# Patient Record
Sex: FEMALE | Race: WHITE | Employment: FULL TIME | URBAN - NONMETROPOLITAN AREA
[De-identification: names, ages, dates, MRNs, and addresses within clinical notes are randomized per-mention and may not be internally consistent; named-entity substitution may affect disease eponyms.]

---

## 2017-05-15 ENCOUNTER — TELEPHONE (OUTPATIENT)
Dept: OPHTHALMOLOGY | Age: 47
End: 2017-05-15

## 2018-04-26 PROBLEM — N64.4 BILATERAL MASTODYNIA: Status: ACTIVE | Noted: 2018-04-26

## 2018-04-26 PROBLEM — S16.1XXA ACUTE CERVICAL MYOFASCIAL STRAIN: Status: ACTIVE | Noted: 2018-04-26

## 2018-04-26 PROBLEM — N62 HYPERTROPHY OF BREAST: Status: ACTIVE | Noted: 2018-04-26

## 2018-04-26 PROBLEM — R21 RASH, SKIN: Status: ACTIVE | Noted: 2018-04-26

## 2020-11-04 ENCOUNTER — HOSPITAL ENCOUNTER (OUTPATIENT)
Age: 50
Discharge: HOME | End: 2020-11-04

## 2020-11-04 PROCEDURE — 36415 COLL VENOUS BLD VENIPUNCTURE: CPT

## 2020-11-04 PROCEDURE — 86481 TB AG RESPONSE T-CELL SUSP: CPT

## 2020-11-07 LAB — T-SPOT TB TEST: NORMAL

## 2020-12-10 ENCOUNTER — OFFICE VISIT (OUTPATIENT)
Dept: ORTHOPEDIC SURGERY | Age: 50
End: 2020-12-10
Payer: COMMERCIAL

## 2020-12-10 VITALS — WEIGHT: 167 LBS | HEIGHT: 61 IN | BODY MASS INDEX: 31.53 KG/M2

## 2020-12-10 PROCEDURE — 99203 OFFICE O/P NEW LOW 30 MIN: CPT | Performed by: STUDENT IN AN ORGANIZED HEALTH CARE EDUCATION/TRAINING PROGRAM

## 2020-12-10 PROCEDURE — 20610 DRAIN/INJ JOINT/BURSA W/O US: CPT | Performed by: STUDENT IN AN ORGANIZED HEALTH CARE EDUCATION/TRAINING PROGRAM

## 2020-12-10 RX ORDER — BUPIVACAINE HYDROCHLORIDE 2.5 MG/ML
2 INJECTION, SOLUTION INFILTRATION; PERINEURAL ONCE
Status: COMPLETED | OUTPATIENT
Start: 2020-12-10 | End: 2020-12-10

## 2020-12-10 RX ORDER — VENLAFAXINE HYDROCHLORIDE 37.5 MG/1
37.5 CAPSULE, EXTENDED RELEASE ORAL DAILY
COMMUNITY
Start: 2020-05-11 | End: 2021-01-14

## 2020-12-10 RX ORDER — MELOXICAM 7.5 MG/1
TABLET ORAL
COMMUNITY
Start: 2020-11-04 | End: 2021-02-11 | Stop reason: SDUPTHER

## 2020-12-10 RX ORDER — METHYLPREDNISOLONE ACETATE 80 MG/ML
80 INJECTION, SUSPENSION INTRA-ARTICULAR; INTRALESIONAL; INTRAMUSCULAR; SOFT TISSUE ONCE
Status: COMPLETED | OUTPATIENT
Start: 2020-12-10 | End: 2020-12-10

## 2020-12-10 RX ADMIN — METHYLPREDNISOLONE ACETATE 80 MG: 80 INJECTION, SUSPENSION INTRA-ARTICULAR; INTRALESIONAL; INTRAMUSCULAR; SOFT TISSUE at 13:52

## 2020-12-10 RX ADMIN — BUPIVACAINE HYDROCHLORIDE 5 MG: 2.5 INJECTION, SOLUTION INFILTRATION; PERINEURAL at 13:52

## 2020-12-10 NOTE — PROGRESS NOTES
[facility]  [facility] ORTHO SPECIALISTS  [address] [facility] [address]  Dept: [phone]    Ambulatory Orthopedic Consult      CHIEF COMPLAINT:    Chief Complaint   Patient presents with    New Patient     RT HIP PAIN x3 weeks radiating down to knee       HISTORY OF PRESENT ILLNESS:      The patient is a 53 y.o. female who is being seen at the request of  Lilia Banerjee M.D., MD for consultation and evaluation of right hip pain. Patient states she has had right hip pain for approximately 3 weeks. Patient states it is over her proximal lateral thigh as well as her lower back. Patient denies any numbness or tingling. Patient states her pain does radiate down into the lateral aspect of her right knee. Patient states she had a similar issue 2 years ago when she was preparing for marathon and she thought it was her piriformis but it did improve without any intervention. Patient does take Mobic for another issue as well as Tylenol. Patient states the Mobic does not help with her right hip pain but the Tylenol does take the edge away. Patient states very painful in her right side as well as her back. Patient denies any low back injury. No prior history of trauma to her right lower extremity. Patient does work in the [employer] at [address] [facility].       Past Medical History:    Past Medical History:   Diagnosis Date    Hyperlipidemia     Seasonal allergies        Past Surgical History:    Past Surgical History:   Procedure Laterality Date    APPENDECTOMY      DILATION AND CURETTAGE OF UTERUS  1993    DILATION AND CURETTAGE OF UTERUS  1997    HYSTEROSCOPY      KNEE ARTHROSCOPY Right 03/26/90    TUBAL LIGATION  2001    WISDOM TOOTH EXTRACTION         CurrentMedications:   Current Outpatient Medications   Medication Sig Dispense Refill    meloxicam (MOBIC) 7.5 MG tablet       venlafaxine (EFFEXOR XR) 37.5 MG extended release capsule Take 37.5 mg by mouth daily      sertraline (ZOLOFT) 50 MG tablet Take 50 mg by mouth      rOPINIRole (REQUIP) 0.25 MG tablet Take 0.25 mg by mouth       Current Facility-Administered Medications   Medication Dose Route Frequency Provider Last Rate Last Dose    methylPREDNISolone acetate (DEPO-MEDROL) injection 80 mg  80 mg Intra-articular Once Valdene Duet, DO        bupivacaine (MARCAINE) 0.25 % injection 5 mg  2 mL Intra-articular Once Valdene Duet, DO           Allergies:    Latex; Betadine [povidone iodine]; and Vicodin [hydrocodone-acetaminophen]    Social History:      Social History     Socioeconomic History    Marital status:      Spouse name: Not on file    Number of children: Not on file    Years of education: Not on file    Highest education level: Not on file   Occupational History    Not on file   Social Needs    Financial resource strain: Not on file    Food insecurity     Worry: Not on file     Inability: Not on file    Transportation needs     Medical: Not on file     Non-medical: Not on file   Tobacco Use    Smoking status: Never Smoker    Smokeless tobacco: Never Used   Substance and Sexual Activity    Alcohol use:  Yes    Drug use: No    Sexual activity: Not on file   Lifestyle    Physical activity     Days per week: Not on file     Minutes per session: Not on file    Stress: Not on file   Relationships    Social connections     Talks on phone: Not on file     Gets together: Not on file     Attends [employer]al service: Not on file     Active member of club or organization: Not on file     Attends meetings of clubs or organizations: Not on file     Relationship status: Not on file    Intimate partner violence     Fear of current or ex partner: Not on file     Emotionally abused: Not on file     Physically abused: Not on file     Forced sexual activity: Not on file   Other Topics Concern    Not on file   Social History Narrative    Not on file       Family History:  Family History   Problem Relation Age

## 2020-12-22 NOTE — FLOWSHEET NOTE
[x] [facility] &  Therapy  [address]    [phone]  F: [phone]   [] [address]  [facility]   [address]  P: [phone]  F: [phone]  [] [address] &  Therapy  [address]  P: [phone]  F: [phone] [] [address]  P: [phone]  F: [phone]  [] [address]  [address] [facility] [address]   [address]   [place]: [phone]  F: [phone]   [] [facility]  [address] [facility] [address]  [place]: [phone]   F: [phone]     Physical Therapy Cancel/No Show note    Date: 2020  Patient: Matti Betts  : 1970  MRN: [id number]    Cancels/No Shows to date:   For today's appointment patient:    []  Cancelled    [x] Rescheduled appointment    [] No-show     Reason given by patient:    []  Patient ill    []  Conflicting appointment    [] No transportation      [] Conflict with work    [] No reason given    [] Weather related    [] [id number]    [x] Other:      Comments: We had to R/S pt. Due to PT not available.     [] Next appointment was confirmed    Electronically signed by: Mirna Dominguez

## 2020-12-23 ENCOUNTER — HOSPITAL ENCOUNTER (OUTPATIENT)
Dept: PHYSICAL THERAPY | Age: 50
Setting detail: THERAPIES SERIES
Discharge: HOME | End: 2020-12-23
Payer: COMMERCIAL

## 2020-12-30 ENCOUNTER — HOSPITAL ENCOUNTER (OUTPATIENT)
Dept: PHYSICAL THERAPY | Age: 50
Setting detail: THERAPIES SERIES
Discharge: HOME | End: 2020-12-30
Payer: COMMERCIAL

## 2020-12-30 PROCEDURE — 97110 THERAPEUTIC EXERCISES: CPT

## 2020-12-30 PROCEDURE — 97161 PT EVAL LOW COMPLEX 20 MIN: CPT

## 2020-12-30 NOTE — CONSULTS
[x] [facility]  Outpatient Physical Therapy  [address]  Phone: [phone]  Fax: [phone] [] [facility] at [address] [place] [address]  Phone: [phone]   Fax: [phone]     Physical Therapy Evaluation    Date:  2020  Patient: Mary Dozier  : 1970  MRN: [id number]  Physician: Priscila Olguin DO, Jake Bradley DO    Insurance: [email or web address]  Medical Diagnosis: Piriformis syndrome Right G57.01, Trochanteric bursitis of right hip M70.61    Rehab Codes: G57.01, M25.551, M25.651  Onset Date: 20                               Next 's appt:     Subjective:   CC:Patient reports Right Lateral hip pain that originates in her buttock. Also notes pain from lateral hip mid thigh> Pain is worse lying directly on her R side and occasionally with prolonged sitting. Pain improves with standing activity. HPI: No direct trauma but patient does recall similar pain in spring of 2020 when she began training for a half marathon. Pain did subside for several months then returned about 2 months ago and has increased in severity. Training eventually ended due to a knee issue.      PMHx: [] Unremarkable [] Diabetes [] HTN  [] Pacemaker   [] MI/Heart Problems [] Cancer [] Arthritis [] Asthma                         [x] refer to full medical chart  In [facility]  [] Other:        Comorbidities:   [x] Obesity [] Dialysis  [] Other:   [] Asthma/COPD [] Dementia [] Other:   [] Stroke [] Sleep apnea [] Other:   [] Vascular disease [] Rheumatic disease [] Other:     Tests: [] X-Ray: [] MRI:  [] Other:  Impression:   No acute osseous abnormality         Medications: [x] Refer to full medical record [] None [] Other:  Allergies:      [x] Refer to full medical record  [] None [] Other:    Working:  [x] Full-time  [] Part-time  [] Off d/t condition  [] Retired  [] Disability  [] N/A  Job/Employer: RN [employer]     Pain:  [x] Yes [] No Location: Right hip/ piriformis Pain Rating: (0-10 scale) 4-7/10  Pain altered Tx:  [] Yes  [x] No  Action:    Objective: p = pain, L = Lacks      ROM  ° A/P STRENGTH    Left Right Left Right   Hip Flexion   full 5/5 4/5   Ext  full 5/5 4-/5   Abd  full 5/5 4-/5   Add  full 5/5 5/5   ER       IR            OBSERVATION Comments   Posture No deficit    Joint Alignment Slight anterior pelvic tilt   Gait No deficit    Palpation Tenderness along R piriformis and R greater trochanter   Edema No deficit    Neurological No deficit      Assessment: Patient presents with R piriformis syndrome and mild greater trochanteric pain syndrome. Patient will benefit from PT to improve R glute strength, resolve pain, and improve ability to perform side lying and prolonged sitting. Problems:    [x] ? Pain: 4-/7 R hip pain. [x] ? Flexibility:Piriformis, hip flexor  [x] ? Strength: Piriformis, glutes  [x] ? Function: LEFS score 58% functional  [] Other:    STG: (to be met in 8 treatments)  1. ? Pain: 2/10 R hip with side lying. 2. ? Strength: 4+/5 R hip extension and abduction to improve posterior hip stability. 3. ? Function: LEFS score to 78% functional to improve ADLs. 4. Independent with Home Exercise Programs    LTG: (to be met in 12 treatments)  1. Patient is able to jog without pain. 2. Patient is able to sleep on her R side. Patient goals: Reduce R hip pain and improve function. Rehab Potential:  [x] Good  [] Fair  [] Poor   Suggested Professional Referral:  [x] No  [] Yes:  Barriers to Goal Achievement[de-identified]  [x] No  [] Yes:  Domestic Concerns:  [x] No  [] Yes:    Pt. Education:  [x] Plans/Goals, Risks/Benefits discussed  [x] Home exercise program: See chart below  Method of Education: [x] Verbal  [x] Demo  [x] Written  Comprehension of Education:  [x] Verbalizes understanding. [x] Demonstrates understanding. [x] Needs Review.   [] Demonstrates/verbalizes understanding of HEP/Ed previously given.    Treatment Plan:  [x] Therapeutic Exercise   26949  [x] Vasopneumatic cold with compression  02852    [x] Therapeutic Activity  52970 [x] Cold/hotpack    [x] Gait Training   56554 [] Lumbar/Cervical Traction  [id number]   [x] Neuromuscular Re-education  58946 [x] Electrical Stimulation Unattended  93707   [x] Manual Therapy    64276 [x] Electrical Stimulation Attended  03590   [] Iontophoresis: 4 mg/mL Dexamethasone Sodium Phosphate  mAmin  31679 []  Medication allergies reviewed for use of   Dexamethasone Sodium Phosphate 4mg/ml with iontophoresis treatments. Pt is not allergic. Frequency:  2 x/week for 12 visits    Todays Treatment:  Precautions:  Modalities:   Exercises:  Access Code: [email or web address]   URL: [email or web address]   Date: 12/30/2020   Prepared by: Diamante Dial     Exercises   Prone Hip Extension - 10 reps - 3 sets - 1x daily - 7x weekly   Clamshell with Resistance - 10 reps - 3 sets - 1x daily - 7x weekly   Supine Bridge with Resistance Band - 10 reps - 3 sets - 1x daily - 7x weekly   Sidelying Hip Abduction - 10 reps - 3 sets - 1x daily - 7x weekly   Supine Figure 4 Piriformis Stretch - 10 reps - 3 sets - 1x daily - 7x weekly   Other:     Specific Instructions for next treatment[de-identified] Hip strengthening, focus on glutes and core.        Evaluation Complexity:  History (Personal factors, comorbidities) [] 0 [x] 1-2 [] 3+   Exam (limitations, restrictions) [] 1-2 [x] 3 [] 4+   Clinical presentation (progression) [x] Stable [] Evolving  [] Unstable   Decision Making [x] Low [] Moderate [] High    [x] Low Complexity [] Moderate Complexity [] High Complexity       Treatment Charges: Mins Units   [x] Evaluation       [x]  Low       []  Moderate       []  High 25 1   []  Modalities     [x]  Ther Exercise 15 1   []  Manual Therapy     []  Ther Activities     []  Aquatics     []  Vasocompression     []  Other       TOTAL TREATMENT TIME: 40    Time in:1500     Time out:1540    Electronically signed by: Jaz Campbell, PT

## 2021-01-04 ENCOUNTER — HOSPITAL ENCOUNTER (OUTPATIENT)
Dept: PHYSICAL THERAPY | Age: 51
Setting detail: THERAPIES SERIES
Discharge: HOME | End: 2021-01-04
Payer: COMMERCIAL

## 2021-01-04 PROCEDURE — 97110 THERAPEUTIC EXERCISES: CPT

## 2021-01-04 PROCEDURE — 97140 MANUAL THERAPY 1/> REGIONS: CPT

## 2021-01-04 NOTE — FLOWSHEET NOTE
[x] [facility]) [facility] &  Therapy  [address]  [phone]  F: [phone] [] [address]  [place] [address]   [address]  P: [phone]  F: [phone] [] [place]  [address]  P: [phone]  F: [phone] [] [address]  P: [phone]  F: [phone] [] [address]  [facility]   [address]   [place]: [phone]  F: [phone]      Physical Therapy Daily Treatment Note    Date:  2021  Patient Name:  Reinaldo Wood    :  1970  MRN: [id number]   Physician: Kaleb Blackwell DO, Radha Rodriguez DO                  Insurance: [email or web address]  Medical Diagnosis: Piriformis syndrome Right G57.01, Trochanteric bursitis of right hip M70.61                         Rehab Codes: G57.01, M25.551, M25.651  Onset Date: 20                               Next 's appt:   Visit# / total visits:  ; Progress note for insurance  STG due at visit # 8    Cancels/No Shows: 0/0    Subjective:    Pain:  [x] Yes  [] No Location: R hip Pain Rating: (0-10 scale) 0/10 no hip pain with arrival, tightness   Pain altered Tx:  [x] No  [] Yes  Action:  Comments:  Coming from work, static  standing a lot this date. Reports  L knee  Pain today. Addressing HEP as recommended and reports she is feeling better but not where she want to be yet.      Objective:  Modalities:   Precautions:  Exercises:  Exercise R hip  Reps/ Time Weight/ Level Comments   sci fit 5 mins L 3          Standing      Hip abd 10x2 A    Hip ext  10x2 A    squats add     Sitting      HS stretching  2x30\"     stool         Supine      Quad sets       Piriformis stretch 3x30\"  Figure 4   HS stretch 1x30\"  Belt pt refers, will discontinue sitting Method of Education: [x] Verbal  [x] Demo  [] Written  Comprehension of Education:  [x] Verbalizes understanding. [x] Demonstrates understanding. [] Needs review. [x] Demonstrates/verbalizes HEP/Ed previously given. Plan: [x] Continue current frequency toward long and short term goals. [x] Specific Instructions for subsequent treatments:  Add squats and progress hip ER, gluteal  strength        Time In: 1516            Time Out: 1615    Electronically signed by:  Michela Lyle PTA

## 2021-01-06 ENCOUNTER — HOSPITAL ENCOUNTER (OUTPATIENT)
Dept: PHYSICAL THERAPY | Age: 51
Setting detail: THERAPIES SERIES
Discharge: HOME | End: 2021-01-06
Payer: COMMERCIAL

## 2021-01-06 PROCEDURE — 97110 THERAPEUTIC EXERCISES: CPT

## 2021-01-06 PROCEDURE — 97140 MANUAL THERAPY 1/> REGIONS: CPT

## 2021-01-06 NOTE — FLOWSHEET NOTE
[x] [facility]) - [facility] &  Therapy  [address]  [phone]  F: [phone] [] [address]  [place] [address]   [address]  P: [phone]  F: [phone] [] [address] &  Therapy  [address] [facility]  P: [phone]  F: [phone] [] [address]  P: [phone]  F: [phone] [] [address]  [facility]   [address]   [place]: [phone]  F: [phone]      Physical Therapy Daily Treatment Note    Date:  2021  Patient Name:  Carlos Martin    :  1970  MRN: [id number]   Physician: April Cummins DO, Gwynne Fairly, DO                  Insurance: [employer]  Medical Diagnosis: Piriformis syndrome Right G57.01, Trochanteric bursitis of right hip M70.61                         Rehab Codes: G57.01, M25.551, M25.651  Onset Date: 20                               Next 's appt:   Visit# / total visits:  ; Progress note for insurance  STG due at visit # 8    Cancels/No Shows: 0/0    Subjective:    Pain:  [x] Yes  [] No Location: R hip Pain Rating: (0-10 scale) 4/10   Pain altered Tx:  [x] No  [] Yes  Action:  Comments:  Pt coming in from work, states she has 4/10 pain upon arrival. Pt says following last tx she was able to sleep through the night with MIN discomfort. Pt enjoyed Hypervolt last session.     Objective:  Modalities:   Precautions:  Exercises:  Exercise R hip  Reps/ Time Weight/ Level Comments   sci fit 5 mins L 3          Standing      Hip abd 10x2 A    Hip ext  10x2 A    squats 2x10     Gastroc stretch 2x30\"  ADDED 1/6   HS stretching  2x30\"  Modified to standing 1/6                       Supine      Quad sets       Piriformis stretch 3x30\"  Figure 4 HS stretch 1x30\"  Belt pt refers, will discontinue sitting    IT band stretch 2x30\"  belt   bridging 10x2     SLR 10x2 A Lumbar roll -- No roll used 1/6   iso abdom 10x1     iso abdom March 10x1     iso adom alt opp U/LE 10x1            Side lying      Manual  8 mins  See below   clamshells 10x2 blue 1x10 done due to fatigue    Hip abd 10x1           Prone      Hip ext 10x1     Hip ER 10x1  5\" HELD                Other:   Manual:  hypervolt to right piriformis and gluts in sidelying x 8 mins. Demo and verbal education on self trigger point release and soft tissue massage to gluts/piriformis again this date, pt voiced understanding. Treatment Charges: Mins Units   []  Modalities     [x]  Ther Exercise 50 3   [x]  Manual Therapy 8 1   []  Ther Activities     []  Aquatics     []  Vasocompression     []  Other     Total Treatment time  58 4       Assessment: [x] Progressing toward goals Pt displayed good tolerance to hip abd/ext, added squats to tx plan which patient was able to tolerate with no increased pain or discomfort noted. Verbal cues needed to correct posture/technique. Pt able to complete core and LB strengthening exercises this day with no LB pain/spasms during tx. Fatigue noted during clams however, pt able to complete 1 set each. Pt states most relief from Hypervolt and stretching to gluts and IT band, says she feels better post tx session. Overall, educated patient to continue stretching at home as able with good verbal understanding. [] No change. [] Other:   [x] Patient would continue to benefit from skilled physical therapy services in order to: improve core, hip and gluteal strengthen and HS, IT band and piriformis flexibility. STG: (to be met in 8 treatments)  1. ? Pain: 2/10 R hip with side lying. 2. ? Strength: 4+/5 R hip extension and abduction to improve posterior hip stability. 3. ? Function: LEFS score to 78% functional to improve ADLs. 4. Independent with Home Exercise Programs     LTG: (to be met in 12 treatments)  1. Patient is able to jog without pain. 2. Patient is able to sleep on her R side.      Patient goals: Reduce R hip pain and improve function. Pt. Education:  [x] Yes  [] No  [] Reviewed Prior HEP/Ed  Method of Education: [x] Verbal  [x] Demo  [] Written  Comprehension of Education:  [x] Verbalizes understanding. [x] Demonstrates understanding. [] Needs review. [x] Demonstrates/verbalizes HEP/Ed previously given. Plan: [x] Continue current frequency toward long and short term goals. [x] Specific Instructions for subsequent treatments: progress hip ER, gluteal  Strength, start lunges. Time In: 2:00 pm            Time Out: 3:00pm    Electronically signed by:  Augustina Mo    Patient treated and note written by Augustina Mo, Student Physical Therapist Assistant under supervision of Elo Zuniga PTA.

## 2021-01-12 ENCOUNTER — HOSPITAL ENCOUNTER (OUTPATIENT)
Dept: PHYSICAL THERAPY | Age: 51
Setting detail: THERAPIES SERIES
Discharge: HOME | End: 2021-01-12
Payer: COMMERCIAL

## 2021-01-12 PROCEDURE — 97140 MANUAL THERAPY 1/> REGIONS: CPT

## 2021-01-12 PROCEDURE — 97110 THERAPEUTIC EXERCISES: CPT

## 2021-01-12 NOTE — FLOWSHEET NOTE
[x] [facility]) [facility]  [facility]  [address]  [phone]  F: [phone] [] [address]  [place] [address]   [address]  P: [phone]  F: [phone] [] [address] &  Therapy  [address]  P: [phone]  F: [phone] [] [address]  P: [phone]  F: [phone] [] [address]  [facility]   [address]   [place]: [phone]  F: [phone]      Physical Therapy Daily Treatment Note    Date:  2021  Patient Name:  Shirley Kim    :  1970  MRN: [id number]   Physician: Quincy Moarles DO, Kirk Shah DO                  Insurance: [employer]  Medical Diagnosis: Piriformis syndrome Right G57.01, Trochanteric bursitis of right hip M70.61                         Rehab Codes: G57.01, M25.551, M25.651  Onset Date: 20                               Next 's appt:   Visit# / total visits:  ; Progress note for insurance  STG due at visit # 8    Cancels/No Shows: 0/0    Subjective:    Pain:  [x] Yes  [] No Location: R hip Pain Rating: (0-10 scale) 5/10   Pain altered Tx:  [x] No  [] Yes  Action:  Comments:  Patient demonstrates onset of slightly different pain that is anterior in her hip and extending into her thigh.      Objective:  Modalities:   Precautions:  Exercises:  Exercise R hip  Reps/ Time Weight/ Level Comments   sci fit 5 mins L 3          Standing      4 way hip  20x lime    squats 20x     Lateral step ups  20x     Power strides  20x      Squats  20x           Supine      4 way SLR 15x 3 lb    Piriformis stretch 3x30\"  Figure 4   HS stretch 1x30\"     Bridge w band  10x2 plum           Side lying      clamshells 20x plum    Hip abd 20x 3 lb           Prone      HS curls       Hip ext 10x1     Hip ER 10x1  5\" HELD                Other:   Manual:  Hypervolt to right piriformis, glutes, and quads in sidelying x 8 mins. Treatment Charges: Mins Units   []  Modalities     [x]  Ther Exercise 47 3   [x]  Manual Therapy 8 1   []  Ther Activities     []  Aquatics     []  Vasocompression     []  Other     Total Treatment time  55 4       Assessment: [x] Progressing toward goals. Patient tolerated all exercises well. [] No change. [x] Other:  Possibly presenting with mild hip flexor strain due to change in her symptoms. [x] Patient would continue to benefit from skilled physical therapy services in order to: improve core, hip and gluteal strengthen and HS, IT band and piriformis flexibility. STG: (to be met in 8 treatments)  1. ? Pain: 2/10 R hip with side lying. 2. ? Strength: 4+/5 R hip extension and abduction to improve posterior hip stability. 3. ? Function: LEFS score to 78% functional to improve ADLs. 4. Independent with Home Exercise Programs     LTG: (to be met in 12 treatments)  1. Patient is able to jog without pain. 2. Patient is able to sleep on her R side.      Patient goals: Reduce R hip pain and improve function. Pt. Education:  [x] Yes  [] No  [] Reviewed Prior HEP/Ed  Method of Education: [x] Verbal  [x] Demo  [] Written  Comprehension of Education:  [x] Verbalizes understanding. [x] Demonstrates understanding. [] Needs review. [x] Demonstrates/verbalizes HEP/Ed previously given. Plan: [x] Continue current frequency toward long and short term goals. [x] Specific Instructions for subsequent treatments: add SL bridges.       Time In: 4:00 pm            Time Out: 5:00 pm    Electronically signed by:  Afia Contreras, PT

## 2021-01-14 ENCOUNTER — OFFICE VISIT (OUTPATIENT)
Dept: FAMILY MEDICINE CLINIC | Age: 51
End: 2021-01-14
Payer: COMMERCIAL

## 2021-01-14 ENCOUNTER — HOSPITAL ENCOUNTER (OUTPATIENT)
Dept: PHYSICAL THERAPY | Age: 51
Setting detail: THERAPIES SERIES
Discharge: HOME | End: 2021-01-14
Payer: COMMERCIAL

## 2021-01-14 VITALS
HEART RATE: 83 BPM | TEMPERATURE: 98.1 F | RESPIRATION RATE: 16 BRPM | SYSTOLIC BLOOD PRESSURE: 132 MMHG | OXYGEN SATURATION: 97 % | BODY MASS INDEX: 31.72 KG/M2 | DIASTOLIC BLOOD PRESSURE: 82 MMHG | WEIGHT: 168 LBS | HEIGHT: 61 IN

## 2021-01-14 DIAGNOSIS — R63.5 WEIGHT GAIN: ICD-10-CM

## 2021-01-14 DIAGNOSIS — F32.A DEPRESSION, UNSPECIFIED DEPRESSION TYPE: ICD-10-CM

## 2021-01-14 DIAGNOSIS — Z00.00 PREVENTATIVE HEALTH CARE: Primary | ICD-10-CM

## 2021-01-14 DIAGNOSIS — Z12.31 SCREENING MAMMOGRAM, ENCOUNTER FOR: ICD-10-CM

## 2021-01-14 DIAGNOSIS — G47.00 INSOMNIA, UNSPECIFIED TYPE: ICD-10-CM

## 2021-01-14 PROBLEM — R23.2 HOT FLASHES: Status: ACTIVE | Noted: 2020-02-13

## 2021-01-14 PROBLEM — M77.12 LATERAL EPICONDYLITIS OF LEFT ELBOW: Status: ACTIVE | Noted: 2017-05-09

## 2021-01-14 PROBLEM — S16.1XXA ACUTE CERVICAL MYOFASCIAL STRAIN: Status: RESOLVED | Noted: 2018-04-26 | Resolved: 2021-01-14

## 2021-01-14 PROBLEM — H52.13 MYOPIA OF BOTH EYES WITH ASTIGMATISM AND PRESBYOPIA: Status: ACTIVE | Noted: 2019-04-16

## 2021-01-14 PROBLEM — H52.203 MYOPIA OF BOTH EYES WITH ASTIGMATISM AND PRESBYOPIA: Status: ACTIVE | Noted: 2019-04-16

## 2021-01-14 PROBLEM — R21 RASH, SKIN: Status: RESOLVED | Noted: 2018-04-26 | Resolved: 2021-01-14

## 2021-01-14 PROBLEM — M25.40 JOINT SWELLING: Status: ACTIVE | Noted: 2020-02-13

## 2021-01-14 PROBLEM — E78.5 HYPERLIPIDEMIA: Status: ACTIVE | Noted: 2021-01-14

## 2021-01-14 PROBLEM — H52.4 MYOPIA OF BOTH EYES WITH ASTIGMATISM AND PRESBYOPIA: Status: ACTIVE | Noted: 2019-04-16

## 2021-01-14 PROCEDURE — 99204 OFFICE O/P NEW MOD 45 MIN: CPT | Performed by: NURSE PRACTITIONER

## 2021-01-14 RX ORDER — VENLAFAXINE HYDROCHLORIDE 75 MG/1
75 CAPSULE, EXTENDED RELEASE ORAL DAILY
Qty: 30 CAPSULE | Refills: 3 | Status: SHIPPED | OUTPATIENT
Start: 2021-01-14 | End: 2021-02-18 | Stop reason: SDUPTHER

## 2021-01-14 RX ORDER — HYDROXYZINE HYDROCHLORIDE 25 MG/1
25 TABLET, FILM COATED ORAL NIGHTLY PRN
Qty: 30 TABLET | Refills: 0 | Status: SHIPPED | OUTPATIENT
Start: 2021-01-14 | End: 2022-03-14 | Stop reason: SDUPTHER

## 2021-01-14 RX ORDER — PHENTERMINE HYDROCHLORIDE 37.5 MG/1
37.5 CAPSULE ORAL EVERY MORNING
Qty: 30 CAPSULE | Refills: 0 | Status: SHIPPED | OUTPATIENT
Start: 2021-01-14 | End: 2021-02-13

## 2021-01-14 ASSESSMENT — ENCOUNTER SYMPTOMS
SHORTNESS OF BREATH: 0
ABDOMINAL PAIN: 0
CHEST TIGHTNESS: 0
COLOR CHANGE: 0
SORE THROAT: 0
ABDOMINAL DISTENTION: 0
NAUSEA: 0
DIARRHEA: 0
CONSTIPATION: 0
BACK PAIN: 0
COUGH: 0
RHINORRHEA: 0

## 2021-01-14 ASSESSMENT — PATIENT HEALTH QUESTIONNAIRE - PHQ9
SUM OF ALL RESPONSES TO PHQ9 QUESTIONS 1 & 2: 1
1. LITTLE INTEREST OR PLEASURE IN DOING THINGS: 0

## 2021-01-14 NOTE — PATIENT INSTRUCTIONS
Health Maintenance Recommendations  Exercise   · I generally recommend that people of all ages try to get 150 minutes of physical activity per week and it doesnt matter how this totals up, in other words 30 minutes 5 days per week is as good as 50 minutes 3 days a week and so on. · The level of activity should be such that it is able to get your heart rate up to 100 or more, for example a brisk walk should achieve this rate. Dietary Recommendations  · In terms of diet, I generally recommend trying to eat a healthy well balanced diet full of fruits and vegetables. Avoid carbonated drinks and fruit juices and limit your alcohol use. · Avoid processed foods wherever possible (anything that comes in a can or a box) which can be achieved by sticking to the outside walls of the grocery store where generally you will find fresh fruits/vegetables, meats, dairy, and frozen foods. · Try to avoid starches in the diet where possible and minimize bread, rice, potatoes, and pasta in the diet. Specifically try to avoid gluten, which even in people that dont have a fatou allergy, causes havoc in the small intestine and alters absorption of nutrients which can in turn lead to obesity. Sleep  · Try to achieve a regular sleep schedule, waking and laying down at the same time each night. Most people need 7 hours per night plus or minus 2 hours. · You will know that youre getting enough because you will wake feeling refreshed and not need to sleep in to catch up on weekends. Skin Care  · Make sure that you dont neglect your skin. · Play it safe in the sun. Use a sunblock on all of your exposed skin. · The sunblock should be broad spectrum and water resistant. · I do recommend an SPF 30 or higher sun screen any time that you plan to be in the sun for more than 20 minutes, even in the winter or on cloudy days (keep in mind that UV light penetrates clouds and can cause burns even on cloudy days). · Apply 20 to 30 minutes before going out in the sun. Reapply sunblock every 2 hours and after swimming or sweating. Zayda Todd can also damage your skin on cool, windy days. Clouds and fog do not filter UV light. Make sure you reapply sunblock every 2 hours. · Avoid the sun in the middle of the day, between 10 AM to 4 PM. Your unprotected skin can be damaged in 15 minutes with direct sun exposure. Personal Health  · If you smoke, STOP. There are many resources available to help you successfully quit. · If you are sexually active, always practice safe sex and wear a condom. · See a dentist every 6 months. · See an eye doctor regularly. · Always wear a seat belt while in car. · Get a flu vaccine annually. · Get a tetanus booster vaccine every 10 years. Psychosocial Health  · Finally, make sure that you always have something to look forward to whether this is a vacation, a special event, or just a weekend off work. Having something to look forward to helps to maintain positive focus and is good for mental health.

## 2021-01-14 NOTE — PROGRESS NOTES
Jane Velasco, APRN-CNP  [address]  [address] [address]  [address] [address]  Dept: [phone]  Dept Fax: [phone]     Patient ID: Nasrin Maxwell is a 53 y.o. female. HPI    Nasrin Maxwell is a 53 y.o. female who presents to the office today for a first visit and to establish a relationship with a new primary care provider. 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 last seen: 2020. She has 3 sons that are successful youngest is 23. She is going back for her bachelor degree, she was at [place] [address] and going to be transferring to [employer]. Mild- moderate hearing loss to left ear and moderate -severe in right, she has hearing aids and sees audiologist for it, she is getting new hearing aids in February. Having some insomnia she sleeps about 2 hours and then wakes up, she has trouble falling asleep. Today, the patient complains of weight gain, she has been struggling since menopause started and she has put 20 lbs on.  she has some hip issues that she is going to PT for and that has made it hard for her to work out like she has in the past. She has trained and done half marathons in the past. She states she has done adipex a few years ago and that really helped her to get going on the weight loss and worked well. She is interested in trying that again. She also notices that she is more depressed and knows it is related to covid, weight gain, and feeling tired. Effexor is taking the edge off but she thinks that it might be time to increase, she is having a lot of hot flashes and feels that the Effexor does help with that as well. Pt denies any N/V/D/C or abdominal pain. Pt denies any fever or chills. Pt today denies any HA, chest pain, or SOB. Preventative care, female:  Last mammogram: 5/4/2020  Last PAP: 2/13/2020  Nicotine use: no  Alcohol use: occasionally  Drug use: no  Dental exam: 6 months ago  Eye exam: May, 2020    Family history of colon cancer. The patient's past medical, surgical, social, and family history as well as her current medications and allergies were reviewed as documented in today's encounter MADINA Kay. Current Outpatient Medications on File Prior to Visit   Medication Sig Dispense Refill    meloxicam (MOBIC) 7.5 MG tablet       venlafaxine (EFFEXOR XR) 37.5 MG extended release capsule Take 37.5 mg by mouth daily      sertraline (ZOLOFT) 50 MG tablet Take 50 mg by mouth      rOPINIRole (REQUIP) 0.25 MG tablet Take 0.25 mg by mouth       No current facility-administered medications on file prior to visit. Subjective:     Review of Systems   Constitutional: Positive for activity change (due to hip going to PT). Negative for appetite change, fatigue and fever. HENT: Negative for congestion, ear pain, rhinorrhea and sore throat. Respiratory: Negative for cough, chest tightness and shortness of breath. Cardiovascular: Negative for chest pain and palpitations. Gastrointestinal: Negative for abdominal distention, abdominal pain, constipation, diarrhea and nausea. Endocrine: Negative for polydipsia, polyphagia and polyuria. Genitourinary: Negative for difficulty urinating and dysuria. Musculoskeletal: Positive for arthralgias, joint swelling (right hip) and myalgias. Negative for back pain. Skin: Negative for color change and rash. Neurological: Negative for dizziness, weakness, light-headedness and headaches. Hematological: Negative for adenopathy. Psychiatric/Behavioral: Positive for dysphoric mood. Negative for agitation and behavioral problems. The patient is nervous/anxious.       Vitals:    01/14/21 1505   BP: 132/82   Pulse: 83   Resp: 16   Temp: 98.1 °F (36.7 °C)   SpO2: 97% she did have a bang today. Objective:     Physical Exam  Vitals signs reviewed. Constitutional:       General: She is not in acute distress. Appearance: Normal appearance. HENT:      Head: Normocephalic and atraumatic. Right Ear: External ear normal.      Left Ear: External ear normal.      Nose: Nose normal.      Mouth/Throat:      Mouth: Mucous membranes are moist.      Pharynx: No oropharyngeal exudate or posterior oropharyngeal erythema. Eyes:      Extraocular Movements: Extraocular movements intact. Conjunctiva/sclera: Conjunctivae normal.      Pupils: Pupils are equal, round, and reactive to light. Neck:      Musculoskeletal: Normal range of motion. Cardiovascular:      Rate and Rhythm: Normal rate and regular rhythm. Pulses: Normal pulses. Heart sounds: Normal heart sounds. No murmur. Pulmonary:      Effort: Pulmonary effort is normal. No respiratory distress. Breath sounds: Normal breath sounds. No wheezing or rales. Abdominal:      General: Bowel sounds are normal. There is no distension. Palpations: Abdomen is soft. Tenderness: There is no abdominal tenderness. Musculoskeletal:      Right hip: She exhibits decreased range of motion, decreased strength and tenderness. Right lower leg: No edema. Left lower leg: No edema. Lymphadenopathy:      Cervical: No cervical adenopathy. Skin:     General: Skin is warm and dry. Neurological:      General: No focal deficit present. Mental Status: She is alert and oriented to person, place, and time. Deep Tendon Reflexes: Reflexes normal.   Psychiatric:         Attention and Perception: Attention and perception normal.         Mood and Affect: Mood is anxious. Mood is not depressed. Behavior: Behavior normal. Behavior is cooperative. Thought Content:  Thought content normal.         Cognition and Memory: Cognition and memory normal.         Judgment: Judgment normal. Assessment:      Diagnosis Orders   1. Preventative health care  Comprehensive Metabolic Panel    Hemoglobin A1C    Lipid Panel    Vitamin D 25 Hydroxy    CBC Auto Differential   2. Screening mammogram, encounter for  ABIOLA DIGITAL SCREEN W OR WO CAD BILATERAL   3. Depression, unspecified depression type  venlafaxine (EFFEXOR XR) 75 MG extended release capsule   4. Insomnia, unspecified type  hydrOXYzine (ATARAX) 25 MG tablet   5. Weight gain  phentermine (ADIPEX-P) 37.5 MG capsule   6. BMI 31.0-31.9,adult  phentermine (ADIPEX-P) 37.5 MG capsule        Plan:     - We did discuss in detail the and the recommended preventative screening guidelines (HTN, lipid, DM, breast, cervical cancer, colorectal and osteoporosis). - Detailed education was provided on the patient's after visit summary.  - Will order above noted labs and discuss them at follow up visit. - Will cont to follow with  OB/GYN as instructed for routine PAP. - Annual mammograms as recommended to be completed after 5/4/2020.    -will increase Effexor and see if that helps with hot flashes and mood. -start hydroxyzine as needed at night to help with insomnia. - I did d/w pt about starting Adipex and treating for 3 months and will need to be seen monthly for BP and weight check and then will need to take a 6 month holiday off the medication.   - I also did stress the importance of exercise to aim for 150 minutes of aerobic activity a week and to watch her diet and calorie intake. - Discussion had with patient regarding the prescribing of a controlled substance for weight loss (Adipex), the provider is required by law to see the patient  an appointment every thirty days. This is neccessary to document an accurate weight and blood pressure. It is also essential to assess the patients efforts to lose weight and to ensure there are no contraindications or adverse effects. Patient verbalized understanding. - will see back in 1 month for follow up. Medications, labs, diagnostic studies, consultations and follow-up as documented in this encounter. Rest of systems unchanged, continue current treatments    On this date 01/14/21 I have spent 45 minutes reviewing previous notes, test results and face to face with the patient discussing the diagnosis and importance of compliance with the treatment plan as well as documenting on the day of the visit. Carla Bravo.  APRN-CNP

## 2021-01-14 NOTE — FLOWSHEET NOTE
[x] [facility]) [facility] &  Therapy  [address]    [phone]  F: [phone]   [] [address]  [facility]   [address]  P: [phone]  F: [phone]  [] [place]  [address]  P: [phone]  F: [phone] [] [address]  P: [phone]  F: [phone]  [] [address]  [facility]   [address]   [place]: [phone]  F: [phone]   [] [place] [address] [facility] [address]  [place]: [phone]   F: [phone]     Physical Therapy Cancel/No Show note    Date: 2021  Patient: Dennis Fontana  : 1970  MRN: [id number]    Cancels/No Shows to date:     For today's appointment patient:    [x]  Cancelled    [] Rescheduled appointment    [] No-show     Reason given by patient:    []  Patient ill    [x]  Conflicting appointment    [] No transportation      [] Conflict with work    [] No reason given    [] Weather related    [] [id number]    [] Other:      Comments:         [x] Next appointment was confirmed     Electronically signed by: Leelee Benites   Patient treated and note written by Leelee Benites, Student Physical Therapist Assistant under supervision of Bret Hernandez PTA.

## 2021-01-14 NOTE — PROGRESS NOTES
Visit Information    Have you changed or started any medications since your last visit including any over-the-counter medicines, vitamins, or herbal medicines? no   Have you stopped taking any of your medications? Is so, why? -  no  Are you having any side effects from any of your medications? - no    Have you seen any other physician or provider since your last visit?  no   Have you had any other diagnostic tests since your last visit?  no   Have you been seen in the emergency room and/or had an admission in a hospital since we last saw you?  no   Have you had your routine dental cleaning in the past 6 months?  yes -      Do you have an active MyChart account? If no, what is the barrier?   Yes    Patient Care Team:  JULY Berger CNP as PCP - General (Certified Nurse Practitioner)    Medical History Review  Past Medical, Family, and Social History reviewed and does contribute to the patient presenting condition    Health Maintenance   Topic Date Due    Hepatitis C screen  1970    Lipid screen  06/18/2011    Diabetes screen  06/18/2011    HIV screen  01/14/2031 (Originally 6/18/1986)    Cervical cancer screen  02/13/2023    DTaP/Tdap/Td vaccine (4 - Td) 03/06/2028    Hepatitis B vaccine  Completed    Flu vaccine  Completed    Hepatitis A vaccine  Aged Out    Hib vaccine  Aged Out    Meningococcal (ACWY) vaccine  Aged Out    Pneumococcal 0-64 years Vaccine  Aged Out

## 2021-01-18 ENCOUNTER — HOSPITAL ENCOUNTER (OUTPATIENT)
Dept: PHYSICAL THERAPY | Age: 51
Setting detail: THERAPIES SERIES
Discharge: HOME | End: 2021-01-18
Payer: COMMERCIAL

## 2021-01-18 PROCEDURE — 97140 MANUAL THERAPY 1/> REGIONS: CPT

## 2021-01-18 PROCEDURE — 97110 THERAPEUTIC EXERCISES: CPT

## 2021-01-18 NOTE — FLOWSHEET NOTE
[x] [facility]) - [facility] &  Therapy  [address]  [phone]  F: [phone] [] [address]  [place] [address]   [address]  P: [phone]  F: [phone] [] [address] &  Therapy  [address]  P: [phone]  F: [phone] [] [address]  P: [phone]  F: [phone] [] [address]  [facility]   [address]   [place]: [phone]  F: [phone]      Physical Therapy Daily Treatment Note    Date:  2021  Patient Name:  Edwin Bettencourt    :  1970  MRN: [id number]   Physician: Jeet Caldwell DO, Hilton Peacock DO                  Insurance: [employer]  Medical Diagnosis: Piriformis syndrome Right G57.01, Trochanteric bursitis of right hip M70.61                         Rehab Codes: G57.01, M25.551, M25.651  Onset Date: 20                               Next 's appt:   Visit# / total visits:  ; Progress note for insurance  STG due at visit # 8    Cancels/No Shows: 0/0    Subjective:    Pain:  [x] Yes  [] No Location: R hip Pain Rating: (0-10 scale) 0/10   Pain altered Tx:  [x] No  [] Yes  Action:  Comments:  Patient reports some pain over the weekend especially Saturday night when trying to sleep. Anterior hip pain has resolved from last week.      Objective:  Modalities:   Precautions:  Exercises:  Exercise R hip  Reps/ Time Weight/ Level Comments   sci fit 5 mins L 3          Standing      4 way hip  20x lime    Lateral power strides 20x 8 in     Sumo Squats  30x     SL Squats 20x 12 in           Supine      4 way SLR  3 lb    Piriformis stretch HEP  Figure 4   HS stretch HEP      Bridge w band  20x plum                 Side lying      clamshells 20x plum    Hip abd 20x Plum Prone      Hip ext 2x10x [place]     Hip ER 2x10x plum HELD                Other:   Manual:  Hypervolt to right piriformis, glutes, and quads in sidelying x 8 mins. Treatment Charges: Mins Units   []  Modalities     [x]  Ther Exercise 35 2   [x]  Manual Therapy 8 1   []  Ther Activities     []  Aquatics     []  Vasocompression     []  Other     Total Treatment time 43 3       Assessment: [x] Progressing toward goals. [] No change. [x] Other:  Glute fatigue noted during new SL squats and banded hip extension. [x] Patient would continue to benefit from skilled physical therapy services in order to: improve core, hip and gluteal strengthen and HS, IT band and piriformis flexibility. STG: (to be met in 8 treatments)  1. ? Pain: 2/10 R hip with side lying. 2. ? Strength: 4+/5 R hip extension and abduction to improve posterior hip stability. 3. ? Function: LEFS score to 78% functional to improve ADLs. 4. Independent with Home Exercise Programs     LTG: (to be met in 12 treatments)  1. Patient is able to jog without pain. 2. Patient is able to sleep on her R side.      Patient goals: Reduce R hip pain and improve function. Pt. Education:  [x] Yes  [] No  [] Reviewed Prior HEP/Ed, Issued 4 way hip and blue band   Method of Education: [x] Verbal  [x] Demo  [] Written  Comprehension of Education:  [x] Verbalizes understanding. [x] Demonstrates understanding. [] Needs review. [x] Demonstrates/verbalizes HEP/Ed previously given. Plan: [x] Continue current frequency toward long and short term goals. [x] Specific Instructions for subsequent treatments: add SL bridges.       Time In: 4:00 pm            Time Out: 4:43 pm    Electronically signed by:  Aureliano Barroso, PT

## 2021-01-20 ENCOUNTER — HOSPITAL ENCOUNTER (OUTPATIENT)
Dept: PHYSICAL THERAPY | Age: 51
Setting detail: THERAPIES SERIES
Discharge: HOME | End: 2021-01-20
Payer: COMMERCIAL

## 2021-01-20 PROCEDURE — 97140 MANUAL THERAPY 1/> REGIONS: CPT

## 2021-01-20 PROCEDURE — 97110 THERAPEUTIC EXERCISES: CPT

## 2021-01-20 NOTE — FLOWSHEET NOTE
[x] [facility]) - [facility] &  Therapy  [address]  [phone]  F: [phone] [] [address]  [address]   [address]  P: [phone]  F: [phone] [] [address] &  Therapy  [address]  P: [phone]  F: [phone] [] [address]  P: [phone]  F: [phone] [] [address]  [facility]   [address]   [place]: [phone]  F: [phone]      Physical Therapy Daily Treatment Note    Date:  2021  Patient Name:  Nasrin Maxwell    :  1970  MRN: [id number]   Physician: Julianna Noble DO, Mario Hull DO                  Insurance: [employer]  Medical Diagnosis: Piriformis syndrome Right G57.01, Trochanteric bursitis of right hip M70.61                         Rehab Codes: G57.01, M25.551, M25.651  Onset Date: 20                               Next 's appt:   Visit# / total visits:  ; Progress note for insurance  STG due at visit # 8    Cancels/No Shows: 0/0    Subjective:    Pain:  [x] Yes  [] No Location: R hip Pain Rating: (0-10 scale) 5/10   Pain altered Tx:  [x] No  [] Yes  Action:  Comments:  Patient reports 8/10 pain last night that woke her up. Currently 5/10 pain, mostly soreness.      Objective:  Modalities:   Precautions:  Exercises:  Exercise R hip  Reps/ Time Weight/ Level Comments   sci fit 5 mins L 3          Standing      4 way hip  20x lime    Lateral power strides 20x 8 in     Sumo Squats  30x     SL Squats 2x10x 12 in     Rev. lunges 10x     Leg press 10x            Supine      4 way SLR  3 lb    Piriformis stretch HEP  Figure 4   HS stretch HEP      Bridge w band  20x plum    [place] stretch  3x20\"  On low mat          Side lying      clamshells 20x plum Hip abd 20x Plum          Prone      Hip ext 2x10x Plum     Hip IR 2x10x lime    HIp Er  2z10x lime           Other:   Manual:  Hypervolt to right piriformis, glutes, and quads in sidelying x 8 mins. Treatment Charges: Mins Units   []  Modalities     [x]  Ther Exercise 35 2   [x]  Manual Therapy 8 1   []  Ther Activities     []  Aquatics     []  Vasocompression     []  Other     Total Treatment time 43 3       Assessment: [x] Progressing toward goals. Good stretch achieved with pigeon stretch on mat. Ongoing DOMS limited reps with some exercises listed above. [] No change. [x] Other:  Glute fatigue noted during new SL squats and banded hip extension. [x] Patient would continue to benefit from skilled physical therapy services in order to: improve core, hip and gluteal strengthen and HS, IT band and piriformis flexibility. STG: (to be met in 8 treatments)  1. ? Pain: 2/10 R hip with side lying. 2. ? Strength: 4+/5 R hip extension and abduction to improve posterior hip stability. 3. ? Function: LEFS score to 78% functional to improve ADLs. 4. Independent with Home Exercise Programs     LTG: (to be met in 12 treatments)  1. Patient is able to jog without pain. 2. Patient is able to sleep on her R side.      Patient goals: Reduce R hip pain and improve function. Pt. Education:  [x] Yes  [] No  [] Reviewed Prior HEP/Ed, Issued 4 way hip and blue band   Method of Education: [x] Verbal  [x] Demo  [] Written  Comprehension of Education:  [x] Verbalizes understanding. [x] Demonstrates understanding. [] Needs review. [x] Demonstrates/verbalizes HEP/Ed previously given. Plan: [x] Continue current frequency toward long and short term goals. [x] Specific Instructions for subsequent treatments: add SL bridges.       Time In: 3:00 pm            Time Out: 3:45 pm    Electronically signed by:  Maico Quiles, PT

## 2021-01-25 ENCOUNTER — HOSPITAL ENCOUNTER (OUTPATIENT)
Dept: PHYSICAL THERAPY | Age: 51
Setting detail: THERAPIES SERIES
Discharge: HOME | End: 2021-01-25
Payer: COMMERCIAL

## 2021-01-25 PROCEDURE — 97110 THERAPEUTIC EXERCISES: CPT

## 2021-01-25 PROCEDURE — 97140 MANUAL THERAPY 1/> REGIONS: CPT

## 2021-01-25 NOTE — FLOWSHEET NOTE
[x] [facility]) - [facility]  [address]  [phone]  F: [phone] [] [address]  [place] [address]   [address]  P: [phone]  F: [phone] [] [address] &  Therapy  [address]  P: [phone]  F: [phone] [] [address]  P: [phone]  F: [phone] [] [address]  [facility]   [address]   [place]: [phone]  F: [phone]      Physical Therapy Daily Treatment Note    Date:  2021  Patient Name:  Navid Barton    :  1970  MRN: [id number]   Physician: Laird Nyhan, DO, Zack Villagomez DO                  Insurance: [employer]  Medical Diagnosis: Piriformis syndrome Right G57.01, Trochanteric bursitis of right hip M70.61                         Rehab Codes: G57.01, M25.551, M25.651  Onset Date: 20                               Next 's appt:   Visit# / total visits:  ; Progress note for insurance  STG due at visit # 8    Cancels/No Shows: 0/0    Subjective:    Pain:  [x] Yes  [] No Location: R hip Pain Rating: (0-10 scale) 5/10   Pain altered Tx:  [x] No  [] Yes  Action:  Comments:  Patient is doing much better than last session, Patient is still having pain at night but is having a good day.      Objective:  Modalities:   Precautions:  Exercises:  Exercise R hip  Reps/ Time Weight/ Level Comments   sci fit 5 mins L 3          Standing      4 way hip  20x lime    Lateral power strides 20x 8 in     Sumo Squats  20x     SL Squats x 12 in     Rev. lunges 20x     Leg press 3x10x            Supine      3 way SLR 20x 3 lb    Piriformis stretch HEP  Figure 4   HS stretch HEP      Bridge w band  20x plum    [place] stretch  3x20\"  On low mat          Side lying clamshells 20x plum    Hip abd 20x 3 lb          Seated      Hip add 15x 3\"  Green ball    Prone      Hip IR  lime    HIp Er   lime           Other:   Manual:  Hypervolt to right piriformis, glutes, and quads in sidelying x 5 mins. Treatment Charges: Mins Units   []  Modalities     [x]  Ther Exercise 40 2   [x]  Manual Therapy 5 1   []  Ther Activities     []  Aquatics     []  Vasocompression     []  Other     Total Treatment time 45 3       Assessment: [x] Progressing toward goals. Improved tolerance to reverse lunges, but still challenging. [] No change. [x] Other:  Glute medius tenderness noted at end of session       [x] Patient would continue to benefit from skilled physical therapy services in order to: improve core, hip and gluteal strengthen and HS, IT band and piriformis flexibility. STG: (to be met in 8 treatments)  1. ? Pain: 2/10 R hip with side lying. 2. ? Strength: 4+/5 R hip extension and abduction to improve posterior hip stability. 3. ? Function: LEFS score to 78% functional to improve ADLs. 4. Independent with Home Exercise Programs     LTG: (to be met in 12 treatments)  1. Patient is able to jog without pain. 2. Patient is able to sleep on her R side.      Patient goals: Reduce R hip pain and improve function. Pt. Education:  [x] Yes  [] No  [] Reviewed Prior HEP/Ed, Issued 4 way hip and blue band   Method of Education: [x] Verbal  [x] Demo  [] Written  Comprehension of Education:  [x] Verbalizes understanding. [x] Demonstrates understanding. [] Needs review. [x] Demonstrates/verbalizes HEP/Ed previously given. Plan: [x] Continue current frequency toward long and short term goals. [x] Specific Instructions for subsequent treatments: add SL bridges.       Time In: 3:32 pm            Time Out: 4:17pm    Electronically signed by:  Allison Moscoso PT

## 2021-01-28 ENCOUNTER — HOSPITAL ENCOUNTER (OUTPATIENT)
Dept: PHYSICAL THERAPY | Age: 51
Setting detail: THERAPIES SERIES
Discharge: HOME | End: 2021-01-28
Payer: COMMERCIAL

## 2021-01-28 PROCEDURE — 97110 THERAPEUTIC EXERCISES: CPT

## 2021-01-28 NOTE — FLOWSHEET NOTE
[x] [facility]) - [facility] &  Therapy  [address]  [phone]  F: [phone] [] [address]  [place] [address]   [address]  P: [phone]  F: [phone] [] [address] &  Therapy  [address]  P: [phone]  F: [phone] [] [address]  P: [phone]  F: [phone] [] [address]  [facility]   [address]   [place]: [phone]  F: [phone]      Physical Therapy Daily Treatment Note    Date:  2021  Patient Name:  Ingrid Lin    :  1970  MRN: [id number]   Physician: Andrade Rouse DO, Wale Tucker DO                  Insurance: [employer]  Medical Diagnosis: Piriformis syndrome Right G57.01, Trochanteric bursitis of right hip M70.61                         Rehab Codes: G57.01, M25.551, M25.651  Onset Date: 20                               Next 's appt:   Visit# / total visits:  ; Progress note for insurance  STG due at visit # 8    Cancels/No Shows: 0/0    Subjective:    Pain:  [x] Yes  [] No Location: R hip Pain Rating: (0-10 scale) 5/10   Pain altered Tx:  [x] No  [] Yes  Action:  Comments:  Patient states she's \"not doing bad\" today. Says she has some sciatic nerve twitching that increases her pain throughout the night.      Objective:  Modalities:   Precautions:  Exercises:  Exercise R hip  Reps/ Time Weight/ Level Comments   sci fit 5 mins L 3          Standing      4 way hip  20x lime    Lateral power strides 20x 8 in     Sumo Squats  20x     SL Squats x20 12 in     Rev. lunges 20x     Leg press 3x10           Supine      3 way SLR 20x 3 lb    Piriformis stretch HEP  Figure 4   HS stretch HEP      Bridge w band  20x plum    [place] stretch  3x20\"  On low mat [x] Verbalizes understanding. [x] Demonstrates understanding. [] Needs review. [x] Demonstrates/verbalizes HEP/Ed previously given. Plan: [x] Continue current frequency toward long and short term goals. [x] Specific Instructions for subsequent treatments: add SL bridges. Time In: 3:32 pm            Time Out: 4:27pm    Electronically signed by:  Elissa Hart    Patient treated and note written by Elissa Hart, Student Physical Therapist Assistant under supervision of Salty Humphrey PTA.

## 2021-02-08 ENCOUNTER — HOSPITAL ENCOUNTER (OUTPATIENT)
Age: 51
Discharge: HOME | End: 2021-02-08
Payer: COMMERCIAL

## 2021-02-08 DIAGNOSIS — Z00.00 PREVENTATIVE HEALTH CARE: ICD-10-CM

## 2021-02-08 LAB
ABSOLUTE EOS #: 0.06 K/UL (ref 0–0.44)
ABSOLUTE IMMATURE GRANULOCYTE: <0.03 K/UL (ref 0–0.3)
ABSOLUTE LYMPH #: 2.26 K/UL (ref 1.1–3.7)
ABSOLUTE MONO #: 0.46 K/UL (ref 0.1–1.2)
ALBUMIN SERPL-MCNC: 4.3 G/DL (ref 3.5–5.2)
ALBUMIN/GLOBULIN RATIO: 1.7 (ref 1–2.5)
ALP BLD-CCNC: 70 U/L (ref 35–104)
ALT SERPL-CCNC: 16 U/L (ref 5–33)
ANION GAP SERPL CALCULATED.3IONS-SCNC: 8 MMOL/L (ref 9–17)
AST SERPL-CCNC: 20 U/L
BASOPHILS # BLD: 1 % (ref 0–2)
BASOPHILS ABSOLUTE: 0.06 K/UL (ref 0–0.2)
BILIRUB SERPL-MCNC: 0.16 MG/DL (ref 0.3–1.2)
BUN BLDV-MCNC: 17 MG/DL (ref 6–20)
BUN/CREAT BLD: ABNORMAL (ref 9–20)
CALCIUM SERPL-MCNC: 9.2 MG/DL (ref 8.6–10.4)
CHLORIDE BLD-SCNC: 103 MMOL/L (ref 98–107)
CHOLESTEROL/HDL RATIO: 3.5
CHOLESTEROL: 230 MG/DL
CO2: 26 MMOL/L (ref 20–31)
CREAT SERPL-MCNC: 0.82 MG/DL (ref 0.5–0.9)
DIFFERENTIAL TYPE: NORMAL
EOSINOPHILS RELATIVE PERCENT: 1 % (ref 1–4)
ESTIMATED AVERAGE GLUCOSE: 100 MG/DL
GFR AFRICAN AMERICAN: >60 ML/MIN
GFR NON-AFRICAN AMERICAN: >60 ML/MIN
GFR SERPL CREATININE-BSD FRML MDRD: ABNORMAL ML/MIN/{1.73_M2}
GFR SERPL CREATININE-BSD FRML MDRD: ABNORMAL ML/MIN/{1.73_M2}
GLUCOSE BLD-MCNC: 83 MG/DL (ref 70–99)
HBA1C MFR BLD: 5.1 % (ref 4–6)
HCT VFR BLD CALC: 41.4 % (ref 36.3–47.1)
HDLC SERPL-MCNC: 65 MG/DL
HEMOGLOBIN: 13.7 G/DL (ref 11.9–15.1)
IMMATURE GRANULOCYTES: 0 %
LDL CHOLESTEROL: 139 MG/DL (ref 0–130)
LYMPHOCYTES # BLD: 37 % (ref 24–43)
MCH RBC QN AUTO: 31.4 PG (ref 25.2–33.5)
MCHC RBC AUTO-ENTMCNC: 33.1 G/DL (ref 28.4–34.8)
MCV RBC AUTO: 94.7 FL (ref 82.6–102.9)
MONOCYTES # BLD: 8 % (ref 3–12)
NRBC AUTOMATED: 0 PER 100 WBC
PDW BLD-RTO: 12.4 % (ref 11.8–14.4)
PLATELET # BLD: 254 K/UL (ref 138–453)
PLATELET ESTIMATE: NORMAL
PMV BLD AUTO: 10.4 FL (ref 8.1–13.5)
POTASSIUM SERPL-SCNC: 4.4 MMOL/L (ref 3.7–5.3)
RBC # BLD: 4.37 M/UL (ref 3.95–5.11)
RBC # BLD: NORMAL 10*6/UL
SEG NEUTROPHILS: 53 % (ref 36–65)
SEGMENTED NEUTROPHILS ABSOLUTE COUNT: 3.25 K/UL (ref 1.5–8.1)
SODIUM BLD-SCNC: 137 MMOL/L (ref 135–144)
TOTAL PROTEIN: 6.9 G/DL (ref 6.4–8.3)
TRIGL SERPL-MCNC: 132 MG/DL
VITAMIN D 25-HYDROXY: 37.4 NG/ML (ref 30–100)
VLDLC SERPL CALC-MCNC: ABNORMAL MG/DL (ref 1–30)
WBC # BLD: 6.1 K/UL (ref 3.5–11.3)
WBC # BLD: NORMAL 10*3/UL

## 2021-02-08 PROCEDURE — 85025 COMPLETE CBC W/AUTO DIFF WBC: CPT

## 2021-02-08 PROCEDURE — 36415 COLL VENOUS BLD VENIPUNCTURE: CPT

## 2021-02-08 PROCEDURE — 80053 COMPREHEN METABOLIC PANEL: CPT

## 2021-02-08 PROCEDURE — 80061 LIPID PANEL: CPT

## 2021-02-08 PROCEDURE — 83036 HEMOGLOBIN GLYCOSYLATED A1C: CPT

## 2021-02-08 PROCEDURE — 82306 VITAMIN D 25 HYDROXY: CPT

## 2021-02-11 ENCOUNTER — OFFICE VISIT (OUTPATIENT)
Dept: ORTHOPEDIC SURGERY | Age: 51
End: 2021-02-11
Payer: COMMERCIAL

## 2021-02-11 VITALS — WEIGHT: 168 LBS | HEIGHT: 61 IN | BODY MASS INDEX: 31.72 KG/M2

## 2021-02-11 DIAGNOSIS — M70.61 TROCHANTERIC BURSITIS OF RIGHT HIP: Primary | ICD-10-CM

## 2021-02-11 PROCEDURE — 99213 OFFICE O/P EST LOW 20 MIN: CPT | Performed by: STUDENT IN AN ORGANIZED HEALTH CARE EDUCATION/TRAINING PROGRAM

## 2021-02-11 RX ORDER — MELOXICAM 7.5 MG/1
7.5 TABLET ORAL DAILY
Qty: 30 TABLET | Refills: 3 | Status: SHIPPED | OUTPATIENT
Start: 2021-02-11 | End: 2021-08-13

## 2021-02-18 ENCOUNTER — OFFICE VISIT (OUTPATIENT)
Dept: FAMILY MEDICINE CLINIC | Age: 51
End: 2021-02-18
Payer: COMMERCIAL

## 2021-02-18 VITALS
BODY MASS INDEX: 30.42 KG/M2 | OXYGEN SATURATION: 97 % | SYSTOLIC BLOOD PRESSURE: 118 MMHG | TEMPERATURE: 98.2 F | RESPIRATION RATE: 16 BRPM | HEART RATE: 78 BPM | DIASTOLIC BLOOD PRESSURE: 78 MMHG | WEIGHT: 161 LBS

## 2021-02-18 DIAGNOSIS — R63.5 WEIGHT GAIN: ICD-10-CM

## 2021-02-18 DIAGNOSIS — F32.A DEPRESSION, UNSPECIFIED DEPRESSION TYPE: ICD-10-CM

## 2021-02-18 DIAGNOSIS — E78.5 HYPERLIPIDEMIA, UNSPECIFIED HYPERLIPIDEMIA TYPE: Primary | ICD-10-CM

## 2021-02-18 DIAGNOSIS — R23.2 HOT FLASHES: ICD-10-CM

## 2021-02-18 DIAGNOSIS — G47.00 INSOMNIA, UNSPECIFIED TYPE: ICD-10-CM

## 2021-02-18 PROCEDURE — 99214 OFFICE O/P EST MOD 30 MIN: CPT | Performed by: NURSE PRACTITIONER

## 2021-02-18 RX ORDER — PHENTERMINE HYDROCHLORIDE 37.5 MG/1
37.5 CAPSULE ORAL EVERY MORNING
Qty: 30 CAPSULE | Refills: 0 | Status: SHIPPED | OUTPATIENT
Start: 2021-02-18 | End: 2021-03-18 | Stop reason: SDUPTHER

## 2021-02-18 RX ORDER — PHENTERMINE HYDROCHLORIDE 37.5 MG/1
37.5 CAPSULE ORAL EVERY MORNING
COMMUNITY
End: 2021-02-18 | Stop reason: SDUPTHER

## 2021-02-18 RX ORDER — VENLAFAXINE HYDROCHLORIDE 75 MG/1
75 CAPSULE, EXTENDED RELEASE ORAL DAILY
Qty: 30 CAPSULE | Refills: 3 | Status: SHIPPED | OUTPATIENT
Start: 2021-02-18 | End: 2021-09-23 | Stop reason: SDUPTHER

## 2021-02-18 ASSESSMENT — ENCOUNTER SYMPTOMS
SHORTNESS OF BREATH: 0
ABDOMINAL PAIN: 0
COUGH: 0
COLOR CHANGE: 0
RHINORRHEA: 0
BACK PAIN: 0
CONSTIPATION: 0
SORE THROAT: 0
DIARRHEA: 0
NAUSEA: 0
ABDOMINAL DISTENTION: 0
CHEST TIGHTNESS: 0

## 2021-02-18 NOTE — PROGRESS NOTES
Visit Information    Have you changed or started any medications since your last visit including any over-the-counter medicines, vitamins, or herbal medicines? no   Have you stopped taking any of your medications? Is so, why? -  no  Are you having any side effects from any of your medications? - no    Have you seen any other physician or provider since your last visit?  no   Have you had any other diagnostic tests since your last visit?  no   Have you been seen in the emergency room and/or had an admission in a hospital since we last saw you?  no   Have you had your routine dental cleaning in the past 6 months?  no     Do you have an active MyChart account? If no, what is the barrier?   Yes    Patient Care Team:  JULY Grullon CNP as PCP - General (Certified Nurse Practitioner)  JULY Grullon CNP as PCP - [facility]    Medical History Review  Past Medical, Family, and Social History reviewed and does contribute to the patient presenting condition    Health Maintenance   Topic Date Due    Hepatitis C screen  1970    HIV screen  01/14/2031 (Originally 6/18/1986)    Cervical cancer screen  02/13/2023    Lipid screen  02/08/2026    DTaP/Tdap/Td vaccine (4 - Td) 03/06/2028    Hepatitis B vaccine  Completed    Flu vaccine  Completed    COVID-19 Vaccine  Completed    Hepatitis A vaccine  Aged Out    Hib vaccine  Aged Out    Meningococcal (ACWY) vaccine  Aged Out    Pneumococcal 0-64 years Vaccine  Aged Out

## 2021-02-18 NOTE — PROGRESS NOTES
Jarett Babin, APRN-CNP  [address] [facility]  [id number] [address]  [address] [address]  Dept: [phone]  Dept Fax: [phone]     Patient ID: Ariadna Roca is a 53 y.o. female. HPI    Pt here today for BP and weight check while being on Adipex and depression. A refill of the medication is needed today. Pt denies any fever or chills. Pt today denies any HA, chest pain, or SOB. Pt denies any N/V/D/C or abdominal pain. This is month 1 since starting. Since her last visit, she has lost 7 lbs for a cumulative total of 7 lbs. She is also trying the intermittent fasting and not eating during sun down to sun rise. She has an ke that a friend showed her that is helping her with tracking as well. Her depression and hot flashes seem to be stable right now, she did lose her prescription of the 75 mg of Effexor so she has been taking the 37.5. Her insomnia has gotten better but the hydroxyzine made her drowsy when she woke up so she hasn't really taken it. She states she did find a better sleeping position that seems to help her get more sleep at night. They put her on mobic for hip pain but doesn't seem to be working. She states that they did discuss doing an injection in March if the mobic wasn't helping. She states it is just a burning sensation when she is sitting on the exam table. Otherwise patient is doing well on current tx and voices no other concerns today. The patient's past medical, surgical, social, and family history as well as his current medications and allergies were reviewed as documented in today's encounter by MADINA Kay. Previous office notes, labs and diagnostic studies were reviewed prior to and during encounter.     Current Outpatient Medications on File Prior to Visit   Medication Sig Dispense Refill    meloxicam (MOBIC) 7.5 MG tablet Take 1 tablet by mouth daily 30 tablet 3    venlafaxine (EFFEXOR XR) 75 MG extended release capsule Take 1 capsule by mouth daily 30 capsule 3     No current facility-administered medications on file prior to visit. Subjective:     Review of Systems   Constitutional: Negative for activity change, fatigue and fever. HENT: Negative for congestion, ear pain, rhinorrhea and sore throat. Respiratory: Negative for cough, chest tightness and shortness of breath. Cardiovascular: Negative for chest pain and palpitations. Gastrointestinal: Negative for abdominal distention, abdominal pain, constipation, diarrhea and nausea. Endocrine: Negative for polydipsia, polyphagia and polyuria. Genitourinary: Negative for difficulty urinating and dysuria. Musculoskeletal: Positive for arthralgias, joint swelling and myalgias (hip pain). Negative for back pain. Skin: Negative for color change and rash. Neurological: Negative for dizziness, weakness, light-headedness and headaches. Hematological: Negative for adenopathy. Psychiatric/Behavioral: Positive for dysphoric mood (stable). Negative for agitation and behavioral problems. The patient is not nervous/anxious. Vitals:    02/18/21 1607   BP: 118/78   Pulse: 78   Resp: 16   Temp: 98.2 °F (36.8 °C)   SpO2: 97%     Objective:     Physical Exam  Vitals signs reviewed. Constitutional:       General: She is not in acute distress. Appearance: Normal appearance. HENT:      Head: Normocephalic and atraumatic. Right Ear: External ear normal.      Left Ear: External ear normal.      Nose: Nose normal.      Mouth/Throat:      Mouth: Mucous membranes are moist.      Pharynx: No oropharyngeal exudate or posterior oropharyngeal erythema. Eyes:      Extraocular Movements: Extraocular movements intact. Conjunctiva/sclera: Conjunctivae normal.      Pupils: Pupils are equal, round, and reactive to light. Neck:      Musculoskeletal: Normal range of motion.    Cardiovascular:      Rate and Rhythm: Normal rate and regular rhythm. Pulses: Normal pulses. Heart sounds: Normal heart sounds. No murmur. Pulmonary:      Effort: Pulmonary effort is normal. No respiratory distress. Breath sounds: Normal breath sounds. No wheezing or rales. Abdominal:      General: Bowel sounds are normal. There is no distension. Palpations: Abdomen is soft. Tenderness: There is no abdominal tenderness. Musculoskeletal: Normal range of motion. Right lower leg: No edema. Left lower leg: No edema. Lymphadenopathy:      Cervical: No cervical adenopathy. Skin:     General: Skin is warm and dry. Neurological:      General: No focal deficit present. Mental Status: She is alert and oriented to person, place, and time. Deep Tendon Reflexes: Reflexes normal.   Psychiatric:         Attention and Perception: Attention and perception normal.         Mood and Affect: Mood is anxious (stable) and depressed (stable). Behavior: Behavior normal. Behavior is cooperative. Assessment:      Diagnosis Orders   1. BMI 31.0-31.9,adult  phentermine (ADIPEX-P) 37.5 MG capsule   2. Hyperlipidemia, unspecified hyperlipidemia type     3. Depression, unspecified depression type  venlafaxine (EFFEXOR XR) 75 MG extended release capsule   4. Weight gain  phentermine (ADIPEX-P) 37.5 MG capsule   5. Insomnia, unspecified type     6. Hot flashes       Plan:     - continue with Effexor 75 mg daily. -hot flashes seem to be controlled at this time. --Stable: Medication re-filled as needed, con't medications as prescribed, con't current tx plan  - Continue adipex as previously prescribed  - I also did stress the importance of exercise to aim for 150 minutes of aerobic activity a week and to watch her diet and calorie intake. - Will continue with monthly BP and weight checks    -discussed low fat, low cholesterol diet and continue with dietary modifications for hyperlipidemia, will recheck in 3 months.      Return in about 1 month (around 3/18/2021) for adipex. - Rest of systems unchanged, continue current treatments. On this date 02/18/21 I have spent 30 minutes reviewing previous notes, test results and face to face with the patient discussing the diagnosis and importance of compliance with the treatment plan as well as documenting on the day of the visit.     Yazmin Sanchez APRN-CNP

## 2021-02-21 NOTE — PROGRESS NOTES
[facility]  [facility]  [facility] [address]  Dept: [phone]  Dept Fax: [phone]        Ambulatory Follow Up    Subjective:   Johnathon Felder is a 53y.o. year old female who presents to our office today for routine followup regarding her   1. Trochanteric bursitis of right hip    . Chief Complaint   Patient presents with    Hip Pain     right        HPI   This is 50f here for routine follow up of right hip greater trochanter bursitis. Last office visit, pt was given steroid injection and started in physical therapy program. Today, pt reports doing much better after prior interventions. Pt with near complete pain relief to right hip. Occasionally she had increased in pain to right lateral hip and working all day but would resolve soon after. Pt is taking Mobic which helps as well as completing physical therapy. Pt denies numbness, tingling. Review of Systems  Negative except as seen as HPI      Objective :   General: Johnathon Felder is a 53 y.o. female who is alert and oriented and sitting comfortably in our office. Ortho Exam  MS:  R hip: No swelling. No TTP about greater trochanter. Negative log roll. No antalgic gait. Full AROM hip without pain. Compartments soft. 2+ DP pulse. TA/EHL/FHL/GS motor intact. Deep and Superficial Peroneal/Saphenous/Sural SILT. Neuro: alert. oriented  Eyes: Extra-ocular muscles intact  Mouth: Oral mucosa moist. No perioral lesions  Pulm: Respirations unlabored and regular. Skin: warm, well perfused  Psych:   Patient has good fund of knowledge and displays understanging of exam, diagnosis, and plan. Radiology:   none    Assessment:      1. Trochanteric bursitis of right hip       Plan:      Reviewed clinical exam findings with patient. Pt doing very well at this point. Continue therapy stretching exercises, Mobic. New mobic script given. WBAT RLE. Pt can follow up as needed for repeat injections. Orders Placed This Encounter   Medications    meloxicam (MOBIC) 7.5 MG tablet     Sig: Take 1 tablet by mouth daily     Dispense:  30 tablet     Refill:  3          No orders of the defined types were placed in this encounter.       Electronically signed by Gee Bhardwaj DO   Orthopedic Surgery Resident PGY-3  [facility]  2/21/2021 at 11:06 AM

## 2021-03-18 ENCOUNTER — OFFICE VISIT (OUTPATIENT)
Dept: FAMILY MEDICINE CLINIC | Age: 51
End: 2021-03-18
Payer: COMMERCIAL

## 2021-03-18 VITALS
SYSTOLIC BLOOD PRESSURE: 118 MMHG | OXYGEN SATURATION: 97 % | BODY MASS INDEX: 29.48 KG/M2 | DIASTOLIC BLOOD PRESSURE: 86 MMHG | WEIGHT: 156 LBS | HEART RATE: 70 BPM | TEMPERATURE: 97.4 F

## 2021-03-18 DIAGNOSIS — E78.5 HYPERLIPIDEMIA, UNSPECIFIED HYPERLIPIDEMIA TYPE: ICD-10-CM

## 2021-03-18 DIAGNOSIS — R63.5 WEIGHT GAIN: ICD-10-CM

## 2021-03-18 DIAGNOSIS — F32.A DEPRESSION, UNSPECIFIED DEPRESSION TYPE: ICD-10-CM

## 2021-03-18 PROCEDURE — 99213 OFFICE O/P EST LOW 20 MIN: CPT | Performed by: NURSE PRACTITIONER

## 2021-03-18 RX ORDER — PHENTERMINE HYDROCHLORIDE 37.5 MG/1
37.5 CAPSULE ORAL EVERY MORNING
Qty: 30 CAPSULE | Refills: 0 | Status: SHIPPED | OUTPATIENT
Start: 2021-03-18 | End: 2021-04-17

## 2021-03-18 ASSESSMENT — ENCOUNTER SYMPTOMS
NAUSEA: 0
RHINORRHEA: 0
BACK PAIN: 0
DIARRHEA: 0
SORE THROAT: 0
COUGH: 0
SHORTNESS OF BREATH: 0
ABDOMINAL PAIN: 0
ABDOMINAL DISTENTION: 0
COLOR CHANGE: 0
CONSTIPATION: 0
CHEST TIGHTNESS: 0

## 2021-03-18 NOTE — PROGRESS NOTES
Ashutosh Tai, APRN-CNP  [address] [facility]  [address] [address]  [address] [address]  Dept: [phone]  Dept Fax: [phone]     Patient ID: Rocio Argueta is a 53 y.o. female. HPI    Pt here today for BP and weight check while being on Adipex and depression. A refill of the medication is needed today. Pt denies any fever or chills. Pt today denies any HA, chest pain, or SOB. Pt denies any N/V/D/C or abdominal pain. This is ongoing on month 3 since starting. Since her last visit, she has lost 5 lbs for a cumulative total of 12 lbs. She is also trying the intermittent fasting and not eating during sun down to sun rise. She has an ke that a friend showed her that is helping her with tracking as well. Feels mood is better controlled with Effexor at 75 mg. They put her on mobic for hip pain but doesn't seem to be working. She states that they did do an injection, but she states she still gets a burning sensation when she is standing during surgery. Did get new hearing aids and notices a big difference. Otherwise patient is doing well on current tx and voices no other concerns today. The patient's past medical, surgical, social, and family history as well as his current medications and allergies were reviewed as documented in today's encounter by Mckayla Bah MA. Previous office notes, labs and diagnostic studies were reviewed prior to and during encounter. Current Outpatient Medications on File Prior to Visit   Medication Sig Dispense Refill    venlafaxine (EFFEXOR XR) 75 MG extended release capsule Take 1 capsule by mouth daily 30 capsule 3    phentermine (ADIPEX-P) 37.5 MG capsule Take 1 capsule by mouth every morning for 30 days. 30 capsule 0    meloxicam (MOBIC) 7.5 MG tablet Take 1 tablet by mouth daily 30 tablet 3     No current facility-administered medications on file prior to visit.          Subjective: Review of Systems   Constitutional: Negative for activity change, fatigue and fever. HENT: Negative for congestion, ear pain, rhinorrhea and sore throat. Respiratory: Negative for cough, chest tightness and shortness of breath. Cardiovascular: Negative for chest pain and palpitations. Gastrointestinal: Negative for abdominal distention, abdominal pain, constipation, diarrhea and nausea. Endocrine: Negative for polydipsia, polyphagia and polyuria. Genitourinary: Negative for difficulty urinating and dysuria. Musculoskeletal: Positive for arthralgias, joint swelling and myalgias (hip pain). Negative for back pain. Skin: Negative for color change and rash. Neurological: Negative for dizziness, weakness, light-headedness and headaches. Hematological: Negative for adenopathy. Psychiatric/Behavioral: Positive for dysphoric mood (stable). Negative for agitation and behavioral problems. The patient is not nervous/anxious. Vitals:    03/18/21 1623   BP: 118/86   Pulse: 70   Temp: 97.4 °F (36.3 °C)   SpO2: 97%     Objective:     Physical Exam  Vitals signs reviewed. Constitutional:       General: She is not in acute distress. Appearance: Normal appearance. HENT:      Head: Normocephalic and atraumatic. Right Ear: External ear normal.      Left Ear: External ear normal.      Nose: Nose normal.      Mouth/Throat:      Mouth: Mucous membranes are moist.   Eyes:      Conjunctiva/sclera: Conjunctivae normal.   Neck:      Musculoskeletal: Normal range of motion. Cardiovascular:      Rate and Rhythm: Normal rate and regular rhythm. Pulses: Normal pulses. Heart sounds: Normal heart sounds. No murmur. Pulmonary:      Effort: Pulmonary effort is normal. No respiratory distress. Breath sounds: Normal breath sounds. No wheezing or rales. Abdominal:      General: Bowel sounds are normal. There is no distension. Palpations: Abdomen is soft. Tenderness:  There is no abdominal tenderness. Musculoskeletal: Normal range of motion. Skin:     General: Skin is warm and dry. Neurological:      General: No focal deficit present. Mental Status: She is alert and oriented to person, place, and time. Deep Tendon Reflexes: Reflexes normal.   Psychiatric:         Attention and Perception: Attention and perception normal.         Mood and Affect: Mood is anxious (stable) and depressed (stable). Behavior: Behavior normal. Behavior is cooperative. Assessment:      Diagnosis Orders   1. BMI 30.0-30.9,adult     2. Weight gain  phentermine (ADIPEX-P) 37.5 MG capsule   3. Depression, unspecified depression type     4. Hyperlipidemia, unspecified hyperlipidemia type       Plan:     Stable: Medication re-filled as needed, con't medications as prescribed, con't current tx plan  -Follow a low fat, low cholesterol diet.  -discussed low fat, low cholesterol diet and continue with dietary modifications for hyperlipidemia, will recheck in June. - continue with Effexor 75 mg daily. -hot flashes seem to be controlled at this time. - Continue adipex as prescribed  - I also did stress the importance of exercise to aim for 150 minutes of aerobic activity a week and to watch her diet and calorie intake. - Will continue with monthly BP and weight checks    Return in about 1 month (around 4/18/2021) for BP and weight check. - Rest of systems unchanged, continue current treatments. On this date 03/18/21 I have spent 25 minutes reviewing previous notes, test results and face to face with the patient discussing the diagnosis and importance of compliance with the treatment plan as well as documenting on the day of the visit.     Gurwinder MCDOWELL-CORY

## 2021-04-01 ENCOUNTER — OFFICE VISIT (OUTPATIENT)
Dept: ORTHOPEDIC SURGERY | Age: 51
End: 2021-04-01
Payer: COMMERCIAL

## 2021-04-01 VITALS — HEIGHT: 61 IN | BODY MASS INDEX: 29.64 KG/M2 | WEIGHT: 157 LBS

## 2021-04-01 DIAGNOSIS — M25.551 GREATER TROCHANTERIC PAIN SYNDROME OF RIGHT LOWER EXTREMITY: Primary | ICD-10-CM

## 2021-04-01 PROCEDURE — 99213 OFFICE O/P EST LOW 20 MIN: CPT | Performed by: ORTHOPAEDIC SURGERY

## 2021-04-01 NOTE — PROGRESS NOTES
[facility]  [address] [facility] [address]  Dept: [phone]    Orthopedic Clinic Follow Up      SUBJECTIVE: Joseline Obrien is a 53 y.o. female who presents as a follow up for her right hip pain. Patient has had pain localized to the lateral right hip since early December 2020. She also describes some right posterior buttock pain and radiation up pain and symptoms into the distal thigh, but not extending past the knee. Patient works as a nurse, and is on her feet constantly. She has tried physical therapy 2-3 times per week since December with no alleviation of her symptoms. She had a greater trochanteric bursa injection on 12/10/2020 with roughly 1 month duration of mild symptomatic relief. She was also started on Mobic for which she takes daily. ROS:   Constitutional: Negative for fever and chills. Respiratory: Negative for cough, shortness of breath. Cardiovascular: Negative for chest pain and palpitations. Musculoskeletal: Positive for right hip pain. Skin: Negative for itching and rash. Neurological: Negative for numbness, tingling, weakness. Psychiatric/Behavioral: Patient display good fund of knowledge, understanding of assessment and plan. OBJECTIVE:  Physical Exam:  General: NAD, sitting comfortably in the room. CV: No dependent edema, regular rate. Pulm: Respirations unlabored and regular. Neuro: Alert. Oriented. Ortho exam:  RLE: Tenderness palpation over the greater trochanter of the right hip. Minimal right groin pain. Pain along the posterior buttock toward the piriformis/sciatic nerve region. Negative straight leg raise, but tight hamstrings. Full range of motion about the hip in both active and passive range of motion. Lateral hip pain with resisted hip abduction. Negative logroll. Negative FADIR. Mild pain with JESUS. Positive Julio's test.  No motor or sensory deficits. DP and PT pulses 2+.     PROCEDURES: None    RADIOLOGY:  No new

## 2021-04-06 ENCOUNTER — TELEPHONE (OUTPATIENT)
Dept: INTERVENTIONAL RADIOLOGY/VASCULAR | Age: 51
End: 2021-04-06

## 2021-04-12 ENCOUNTER — TELEPHONE (OUTPATIENT)
Dept: ORTHOPEDIC SURGERY | Age: 51
End: 2021-04-12

## 2021-04-12 NOTE — TELEPHONE ENCOUNTER
Would the patient be okay having an MRI without contrast?     Insurance is denying MRI with contrast

## 2021-04-12 NOTE — TELEPHONE ENCOUNTER
Arsenio from [employer] called this afternoon stating the pt is scheduled for an MRI with and without contrast.  The insurance has denied this and has given an alternate that will be covered:  MRI without contrast     Please call Arsenio to let her know if this is appropriate.   Pt is scheduled for 4/14/21    Please call:  [phone]

## 2021-04-13 NOTE — TELEPHONE ENCOUNTER
We want the MRI with contrast. What do we have to do to get it with contrast? If there is no way to do that then lets get it without, but it wont show us what were looking for.

## 2021-04-16 ENCOUNTER — HOSPITAL ENCOUNTER (OUTPATIENT)
Dept: MRI IMAGING | Age: 51
Discharge: HOME | End: 2021-04-18
Payer: COMMERCIAL

## 2021-04-16 ENCOUNTER — HOSPITAL ENCOUNTER (OUTPATIENT)
Dept: INTERVENTIONAL RADIOLOGY/VASCULAR | Age: 51
Discharge: HOME | End: 2021-04-18
Payer: COMMERCIAL

## 2021-04-16 VITALS — DIASTOLIC BLOOD PRESSURE: 87 MMHG | OXYGEN SATURATION: 99 % | SYSTOLIC BLOOD PRESSURE: 123 MMHG

## 2021-04-16 DIAGNOSIS — M25.551 GREATER TROCHANTERIC PAIN SYNDROME OF RIGHT LOWER EXTREMITY: ICD-10-CM

## 2021-04-16 PROCEDURE — 77002 NEEDLE LOCALIZATION BY XRAY: CPT

## 2021-04-16 PROCEDURE — 6360000004 HC RX CONTRAST MEDICATION: Performed by: ORTHOPAEDIC SURGERY

## 2021-04-16 PROCEDURE — 6360000004 HC RX CONTRAST MEDICATION: Performed by: RADIOLOGY

## 2021-04-16 PROCEDURE — 27093 INJECTION FOR HIP X-RAY: CPT

## 2021-04-16 PROCEDURE — A9579 GAD-BASE MR CONTRAST NOS,1ML: HCPCS | Performed by: ORTHOPAEDIC SURGERY

## 2021-04-16 PROCEDURE — 73722 MRI JOINT OF LWR EXTR W/DYE: CPT

## 2021-04-16 PROCEDURE — 2709999900 HC NON-CHARGEABLE SUPPLY

## 2021-04-16 RX ADMIN — IOVERSOL 15 ML: 741 INJECTION INTRA-ARTERIAL; INTRAVENOUS at 15:48

## 2021-04-16 RX ADMIN — GADOTERIDOL 1 ML: 279.3 INJECTION, SOLUTION INTRAVENOUS at 17:09

## 2021-04-16 NOTE — PROGRESS NOTES
Here for right hip arthrogram. Consent signed. Supine on table. Monitor and strap on. Right hip is prepped and draped. Dr Roland Hinson here. Area numbed with lidocaine. 15 ml injection completed. Bandaid on. Patient taken to MRI in a wheelchair.

## 2021-04-19 ENCOUNTER — PATIENT MESSAGE (OUTPATIENT)
Dept: FAMILY MEDICINE CLINIC | Age: 51
End: 2021-04-19

## 2021-04-19 RX ORDER — PREDNISONE 20 MG/1
TABLET ORAL
Qty: 17 TABLET | Refills: 0 | Status: SHIPPED | OUTPATIENT
Start: 2021-04-19 | End: 2021-04-22

## 2021-04-19 NOTE — TELEPHONE ENCOUNTER
From: Josette Borrego  To: JULY Hillman - CORY  Sent: 4/19/2021 2:55 PM EDT  Subject: Prescription Question    I just saw the MRI results. Since anti inflammatory meds are not really recommended with tendonosis- what can I take for pain control? ? The Meloxicam doesn't seem to touch it.      Lydia Gold

## 2021-04-22 ENCOUNTER — OFFICE VISIT (OUTPATIENT)
Dept: FAMILY MEDICINE CLINIC | Age: 51
End: 2021-04-22
Payer: COMMERCIAL

## 2021-04-22 VITALS
WEIGHT: 158 LBS | DIASTOLIC BLOOD PRESSURE: 86 MMHG | HEART RATE: 80 BPM | BODY MASS INDEX: 29.85 KG/M2 | SYSTOLIC BLOOD PRESSURE: 122 MMHG | OXYGEN SATURATION: 98 % | RESPIRATION RATE: 16 BRPM | TEMPERATURE: 98.8 F

## 2021-04-22 DIAGNOSIS — E78.5 HYPERLIPIDEMIA, UNSPECIFIED HYPERLIPIDEMIA TYPE: Primary | ICD-10-CM

## 2021-04-22 DIAGNOSIS — M25.551 GREATER TROCHANTERIC PAIN SYNDROME OF RIGHT LOWER EXTREMITY: ICD-10-CM

## 2021-04-22 DIAGNOSIS — M67.80 TENDINOSIS: ICD-10-CM

## 2021-04-22 DIAGNOSIS — F32.A DEPRESSION, UNSPECIFIED DEPRESSION TYPE: ICD-10-CM

## 2021-04-22 PROCEDURE — 99214 OFFICE O/P EST MOD 30 MIN: CPT | Performed by: NURSE PRACTITIONER

## 2021-04-22 RX ORDER — PHENTERMINE HYDROCHLORIDE 37.5 MG/1
37.5 CAPSULE ORAL EVERY MORNING
COMMUNITY
End: 2021-04-22 | Stop reason: ALTCHOICE

## 2021-04-22 ASSESSMENT — ENCOUNTER SYMPTOMS
COLOR CHANGE: 0
DIARRHEA: 0
SORE THROAT: 0
ABDOMINAL PAIN: 0
BACK PAIN: 0
CONSTIPATION: 0
NAUSEA: 0
RHINORRHEA: 0
ABDOMINAL DISTENTION: 0
COUGH: 0
SHORTNESS OF BREATH: 0
CHEST TIGHTNESS: 0

## 2021-04-22 NOTE — PROGRESS NOTES
Randee Gross, APRN-[address]  [id number]  [address] [address]  Dept: [phone]  Dept Fax: [phone]     Patient ID: Nubia Argueta is a 53 y.o. female. HPI    Pt here today for BP and weight check while being on Adipex. Pt denies any fever or chills. Pt today denies any HA, chest pain, or SOB. Pt denies any N/V/D/C or abdominal pain. This was her 3rd month and therapy is now complete. Since her last visit, she has gained 1 lbs for a cumulative total loss of 11 lbs. She states her clothes are fitting better so she still notices a difference. - still having the constant pain to the hip and it has really affected her ability to workout and exercise. She is having trouble at work due to the pain. She did not start the prednisone as she read that it could cause more problems. Pt stated that the mobic helps with her other pain but not the hip. Otherwise patient is doing well on current tx and voices no other concerns today. The patient's past medical, surgical, social, and family history as well as his current medications and allergies were reviewed as documented in today's encounter by MADINA Forrester. My previous office notes, labs and diagnostic studies were reviewed prior to and during encounter. Reviewed prior notes None, Orthopedics and Previous PCP  Reviewed previous Labs, Imaging and Hospital Records    Current Outpatient Medications on File Prior to Visit   Medication Sig Dispense Refill    predniSONE (DELTASONE) 20 MG tablet Take 2 tabs daily x 4 days, then 1 tab daily x 6 days, then 1/2 tab daily x 6 days 17 tablet 0    venlafaxine (EFFEXOR XR) 75 MG extended release capsule Take 1 capsule by mouth daily 30 capsule 3    meloxicam (MOBIC) 7.5 MG tablet Take 1 tablet by mouth daily 30 tablet 3     No current facility-administered medications on file prior to visit.          Subjective:     Review of Systems   Constitutional: Negative for activity change, fatigue and fever. HENT: Negative for congestion, ear pain, rhinorrhea and sore throat. Respiratory: Negative for cough, chest tightness and shortness of breath. Cardiovascular: Negative for chest pain and palpitations. Gastrointestinal: Negative for abdominal distention, abdominal pain, constipation, diarrhea and nausea. Endocrine: Negative for polydipsia, polyphagia and polyuria. Genitourinary: Negative for difficulty urinating and dysuria. Musculoskeletal: Positive for arthralgias (hip pain) and myalgias. Negative for back pain. Skin: Negative for color change and rash. Neurological: Negative for dizziness, weakness, light-headedness and headaches. Hematological: Negative for adenopathy. Psychiatric/Behavioral: Positive for dysphoric mood. Negative for agitation and behavioral problems. The patient is not nervous/anxious. Vitals:    04/22/21 1617   BP: 122/86   Pulse: 80   Resp: 16   Temp: 98.8 °F (37.1 °C)   SpO2: 98%       Objective:     Physical Exam  Vitals signs reviewed. Constitutional:       General: She is not in acute distress. Appearance: Normal appearance. HENT:      Head: Normocephalic and atraumatic. Right Ear: External ear normal.      Left Ear: External ear normal.      Nose: Nose normal.      Mouth/Throat:      Mouth: Mucous membranes are moist.      Pharynx: No oropharyngeal exudate or posterior oropharyngeal erythema. Eyes:      Extraocular Movements: Extraocular movements intact. Conjunctiva/sclera: Conjunctivae normal.      Pupils: Pupils are equal, round, and reactive to light. Neck:      Musculoskeletal: Normal range of motion. Cardiovascular:      Rate and Rhythm: Normal rate and regular rhythm. Pulses: Normal pulses. Heart sounds: Normal heart sounds. No murmur. Pulmonary:      Effort: Pulmonary effort is normal. No respiratory distress.       Breath sounds: Normal breath sounds. No wheezing or rales. Abdominal:      General: Bowel sounds are normal. There is no distension. Palpations: Abdomen is soft. Tenderness: There is no abdominal tenderness. Musculoskeletal:      Right hip: She exhibits decreased range of motion and tenderness. Right lower leg: No edema. Left lower leg: No edema. Lymphadenopathy:      Cervical: No cervical adenopathy. Skin:     General: Skin is warm and dry. Neurological:      General: No focal deficit present. Mental Status: She is alert and oriented to person, place, and time. Deep Tendon Reflexes: Reflexes normal.   Psychiatric:         Mood and Affect: Mood normal.         Behavior: Behavior normal. Behavior is cooperative. Assessment:      Diagnosis Orders   1. Hyperlipidemia, unspecified hyperlipidemia type  Lipid Panel   2. Greater trochanteric pain syndrome of right lower extremity     3. Tendinosis     4. Depression, unspecified depression type       Plan:     - Will continue with current treatment as patient is stable on current treatment. - Follow a low fat, low cholesterol diet. - lipid panel ordered to be completed in June. - continue effexor as prescribed. - continue mobic as prescribed. - adipex 3 month course has been completed. Pt encouraged to continue to follow healthy diet and exercise. Discussed that she has to take 6 months off of adipex. - discussed trying compounding cream from [facility] for the hip tendinosis, she was interested and order was faxed to the pharmacy. Will start with the muscle relaxant cream as that one was suggested for her problem, there is another cream that could be used if this cream doesn't work for the pain. Pt is aware that they will call her to discuss cost and . - she was instructed to let us know if she has any improvement. - pt verbalized understanding plan of care.   - Return in about 2 months (around 7/7/2021) for hip pain, hyperlipidemia.    - Rest of systems unchanged, continue current treatments. On this date 4/22/2021 I have spent 30 minutes reviewing previous notes, test results and face to face with the patient discussing the diagnosis and importance of compliance with the treatment plan as well as documenting on the day of the visit.     Beto Qureshi APRN-CNP

## 2021-05-06 ENCOUNTER — OFFICE VISIT (OUTPATIENT)
Dept: ORTHOPEDIC SURGERY | Age: 51
End: 2021-05-06
Payer: COMMERCIAL

## 2021-05-06 VITALS — WEIGHT: 158 LBS | HEIGHT: 61 IN | BODY MASS INDEX: 29.83 KG/M2

## 2021-05-06 DIAGNOSIS — M25.551 GREATER TROCHANTERIC PAIN SYNDROME OF RIGHT LOWER EXTREMITY: Primary | ICD-10-CM

## 2021-05-06 DIAGNOSIS — M70.61 TROCHANTERIC BURSITIS OF RIGHT HIP: ICD-10-CM

## 2021-05-06 PROCEDURE — 20610 DRAIN/INJ JOINT/BURSA W/O US: CPT | Performed by: STUDENT IN AN ORGANIZED HEALTH CARE EDUCATION/TRAINING PROGRAM

## 2021-05-06 PROCEDURE — 99212 OFFICE O/P EST SF 10 MIN: CPT | Performed by: STUDENT IN AN ORGANIZED HEALTH CARE EDUCATION/TRAINING PROGRAM

## 2021-05-06 NOTE — PROGRESS NOTES
pain with Stinchfield test. Compartments soft. 2+ DP pulse. TA/EHL/FHL/GS motor intact. Deep and Superficial Peroneal/Saphenous/Sural SILT. CV: no obvious JVD, no dependent edema, distal pulses 2+  Respiratory: chest rise symmetric, unlabored respirations, no audible wheezing  Skin: warm, well perfused, no obvious rashes or lesions  Psych: Patient displays understanding of exam, diagnosis, and plan. Radiology:   No radiographs in office    Assessment:        Diagnosis Orders   1. Greater trochanteric pain syndrome of right lower extremity     2. Trochanteric bursitis of right hip         Plan:      Thorough discussion regarding the etiology and prognosis of this patient's lateral hip pain. We discussed the etiology of greater trochanter pain syndrome versus trochanteric bursitis of the right hip. We discussed a second in-office bursa injection versus glut medius injection by interventional radiology. At this time, given the good results of her initial bursal injection provided, we will provide her with a second bursa injection in office. The patient is agreeable for a second injection. Alternatives, risks and benefits were discussed with the patient. If the patient does not receive relief from this bursal injection, we will refer her to interventional radiology for glut medius injection. She will follow up in 2 weeks to discuss if her symptoms have been relieved, if not then we will discuss sending her to IR for a glut medius corticosteroid injection. Follow up: 2 weeks    Injection procedure note  The alternatives, benefits, and risks were discussed with the patient. After answering all questions to the patient's satisfaction, the patient agreed to proceed forward with injection and gave verbal consent for the procedure.     With the patient's permission, appropriate anatomic landmarks were identified and the right greater trochanter bursa was prepped in a sterile fashion using alcohol and/or betadine. A spinal needle was then used to inject 2cc of 0.25% marcaine plain and 80mg depo medrol  into the bursa. The land rose was appreciated with the spinal needle. The injection was advanced without resistance confirming appropriate position. The patient tolerated the procedure well and the site was dressed with a band-aid. Patient was advised to ice the area for 15-20 minutes to relieve any injection site related pain. Patient was advised to contact nurse if area becomes swollen, hot, erythematous, or painful, or to go to the emergency room after business hours.     Jimy Carmona DO  Orthopedic Surgery Resident, PGY-1  [facility]

## 2021-05-18 RX ORDER — METHYLPREDNISOLONE ACETATE 80 MG/ML
80 INJECTION, SUSPENSION INTRA-ARTICULAR; INTRALESIONAL; INTRAMUSCULAR; SOFT TISSUE ONCE
Status: COMPLETED | OUTPATIENT
Start: 2021-05-18 | End: 2021-05-18

## 2021-05-18 RX ORDER — BUPIVACAINE HYDROCHLORIDE 2.5 MG/ML
2 INJECTION, SOLUTION INFILTRATION; PERINEURAL ONCE
Status: COMPLETED | OUTPATIENT
Start: 2021-05-18 | End: 2021-05-18

## 2021-05-18 RX ADMIN — BUPIVACAINE HYDROCHLORIDE 5 MG: 2.5 INJECTION, SOLUTION INFILTRATION; PERINEURAL at 15:17

## 2021-05-18 RX ADMIN — METHYLPREDNISOLONE ACETATE 80 MG: 80 INJECTION, SUSPENSION INTRA-ARTICULAR; INTRALESIONAL; INTRAMUSCULAR; SOFT TISSUE at 15:17

## 2021-06-03 ENCOUNTER — OFFICE VISIT (OUTPATIENT)
Dept: ORTHOPEDIC SURGERY | Age: 51
End: 2021-06-03
Payer: COMMERCIAL

## 2021-06-03 VITALS — HEIGHT: 61 IN | WEIGHT: 158 LBS | BODY MASS INDEX: 29.83 KG/M2

## 2021-06-03 DIAGNOSIS — M25.551 GREATER TROCHANTERIC PAIN SYNDROME OF RIGHT LOWER EXTREMITY: Primary | ICD-10-CM

## 2021-06-03 PROCEDURE — 99212 OFFICE O/P EST SF 10 MIN: CPT | Performed by: STUDENT IN AN ORGANIZED HEALTH CARE EDUCATION/TRAINING PROGRAM

## 2021-06-03 RX ORDER — MELOXICAM 15 MG/1
15 TABLET ORAL DAILY PRN
Qty: 90 TABLET | Refills: 2 | Status: SHIPPED | OUTPATIENT
Start: 2021-06-03 | End: 2021-09-23 | Stop reason: SDUPTHER

## 2021-06-04 NOTE — PROGRESS NOTES
[address] [place] [address] Drive [address]  Dept: [phone]  Dept Fax: [phone]        Ambulatory Follow Up    Subjective:   HPI today 6/3/21: Did well for a few weeks after injection. Did a long walk over memorial day and aggravated hip. Presents today for repeat evaluation. No falls or injuries. Lateral thigh pain radiates to knee occasionally. Prior HPI on 5/6/21: Jose Silva is a 53y.o. year old female who presents to our office today for routine followup regarding right hip pain. Patient reports continued pain localized at the lateral right hip since early December 2020. Patient was last seen in office on 4/1/2021 with Dr. Gilbert Dwyer, where she was sent for an MRI. MRI demonstrated glut medius and minimus tendinosis. Of note, the patient received a greater trochanter bursa injection on 12/10/2020. The patient reported significant pain improvement for several weeks as a result of the injection, however reports that the pain soon returned after a few weeks. At this time, the patient states she is on Medrol Dosepak provided by her PCP as that is the only form of relief at this time. Patient reports that nonsteroidal anti-inflammatory medications provide very little relief for her. At this time, she has no other orthopedic complaints. Chief Complaint   Patient presents with    Follow-up     Rt hip MRI results       HPI    Review of Systems   Constitutional: Negative for fever and chills. HENT: Negative for congestion. Eyes: Negative for blurred vision and double vision. Respiratory: Negative for cough, shortness of breath and wheezing. Cardiovascular: Negative for chest pain and palpitations. Gastrointestinal: Negative for nausea. Negative for vomiting. Musculoskeletal: Positive right lateral hip pain  Skin: Negative for itching and rash. Neurological: Negative for dizziness, sensory change and headaches. Psychiatric/Behavioral: Negative for depression and suicidal ideas. Objective :   General: AAOx3, NAD, appears stated age  Ortho Exam  MS:   RLE: Skin is intact. There is no erythema, ecchymosis or abrasions. TTP noted to greater trochanter and gluteal insertion, mild piriformis pain. Logroll negative. No pain with Stinchfield test. Negative JESUS, FADIR. Compartments soft. 2+ DP pulse. TA/EHL/FHL/GS motor intact. Deep and Superficial Peroneal/Saphenous/Sural SILT. CV: no obvious JVD, no dependent edema, distal pulses 2+  Respiratory: chest rise symmetric, unlabored respirations, no audible wheezing  Skin: warm, well perfused, no obvious rashes or lesions  Psych: Patient displays understanding of exam, diagnosis, and plan. Radiology:   X-rays reviewed from EMR. No new imaging performed today. Assessment:        Diagnosis Orders   1. Right greater trochanteric bursitis  2. Right gluteal tendonopathy  3. Right piriformis syndrome    Plan:      Thorough discussion regarding the etiology and prognosis of this patient's lateral hip pain. We discussed the etiology to greater trochanteric bursitis mixed with gluteal tendonopathy and piriformis syndrome   She had good relief with last corticosteroid injection but over did it this past weekend. Plan for repeat therapy. Discussion held for PRP injection. Patient would like to try PT and if it does not get better consider PRP possibly.  Follow up in 8 weeks or as needed    ----------------------------------------  Wu Joiner DO  PGY-3, Department of [address], [place], [place]

## 2021-06-23 ENCOUNTER — HOSPITAL ENCOUNTER (OUTPATIENT)
Dept: PHYSICAL THERAPY | Facility: CLINIC | Age: 51
Setting detail: THERAPIES SERIES
Discharge: HOME | End: 2021-06-23
Payer: COMMERCIAL

## 2021-06-23 PROCEDURE — 97161 PT EVAL LOW COMPLEX 20 MIN: CPT

## 2021-06-23 PROCEDURE — 97140 MANUAL THERAPY 1/> REGIONS: CPT

## 2021-06-23 NOTE — CONSULTS
[] [facility]) - [facility] &  Therapy  [address]  [phone]  F: [phone] [] [address]  [facility]   [address]  P: [phone]  F: [phone] [] [address] &  Therapy  [address]  P: [phone]  F: [phone] [] [address]  [facility]   [address]   [place]: [phone]  F: [phone]  [x] [address]  P: [phone]  F: [phone]      Physical Therapy Lower Extremity Evaluation    Date:  2021  Patient: Joseline Obrien  :  1970  MRN: [id number]  Physician: Dr. Vivia Crigler: [facility] ([facility])  Medical Diagnosis: Greater trochanteric bursitis RLE  Rehab Codes: M25.551  Onset date: 10/1/20  Next 's appt.: TBA    Subjective:   CC: Pt with atraumatic onset of R hip pain that began in October. Went to [facility] in December and had 8 visits of PT which did help temporarily. Has had two rounds of cortizone injections, first one did not help but second one helped slightly. Currently having pain in buttocks, lateral hip and radiates with a burning feeling distally to mid knee. Has aching in deep hip and groin. PMHx: [] Unremarkable [] Diabetes [] HTN  [] Pacemaker   [] MI/Heart Problems [] Cancer [] Arthritis [] Other:              [x] Refer to full medical chart  In EPIC           Tests: [] X-Ray: [x] MRI:   No right acetabular labral tear identified.       Moderate right gluteus minimus and mild right gluteus medius tendinosis with   adjacent soft tissue edema at the greater trochanteric insertion.       No significant distention of the greater trochanteric bursa.        [] Other:    Medications: [] Refer to full medical record [] None [] Other:  Allergies:      [] Refer to full medical record [] None [] Other:    Function:      Marital Status    Home type    Stairs from outside            Hand rail    Stairs inside            Hand rail    [employer]    Job status RN in the [address]    Work Activities/duties  On feet all day    Recreational Activities Running, bike riding, going for walks       Pain present? Yes    Location R hip laterally    Pain Rating currently 7/10   Pain at worse 10/10   Pain at best 0/10   Description of pain Constant ache    Altered Sensation Burning into R lateral thigh    What makes it worse Standing    What makes it better Rest    Symptom progression Worsening   Sleep Yes, if on L side or stomach                    Objective:    ROM  ° A/P STRENGTH TESTS (+/-) Left Right Not Tested    Left Right Left Right Ant.  Drawer   []   Hip Flex   5/5 4-/5 pain Post. Drawer   []   Ext   4+/5 4/5 Lachmans   []   ER     Valgus Stress   []   IR     Varus Stress   []   ABD    3/5 Gets   []   ADD     Apleys Comp.   []   Knee Flex     Apleys Dist.   []   Ext     Hip Scouring  + groin pain []   Ankle DF knee ext     JESUSs  + []   Ankle DF knee flex            PF     Piriformis   []   INV     Willows  + []   EVER     Talor Tilt   []        Pat-Fem Grind   []       OBSERVATION No Deficit Deficit Not Tested Comments   Posture       Forward Head [] [] []    Rounded Shoulders [] [] []    Kyphosis [] [] []    Lordosis [] [] []    Lateral Shift [] [] []    Scoliosis [] [] []    Iliac Crest [] [] []    PSIS [] [] []    ASIS [] [] []    Genu Valgus [] [] []    Genu Varus [] [] []    Genu Recurvatum [] [] []    Pronation [] [] []    Supination [] [] []    Leg Length Discrp [] [] []    Slumped Sitting [] [] []    Palpation [] [x] [] TTP along entire R IT band, glut med and piriformis    Sensation [] [x] [] Pt has some burning into lateral thigh at times, none currently    Edema [] [] []    Neurological [] [] []    Patellar Mobility [] [] []    Patellar Orientation [] [] []    Gait [] [x] [] Analysis: Slight trendelenburg gait noted         FUNCTION Normal Difficult Unable   Sitting [x] [] []   Standing [] [x] []   Ambulation [] [x] []   Groom/Dress [x] [] []   Lift/Carry [] [x] []   Stairs [] [x] []   Bending [x] [] []   Squat [x] [] []   Kneel [x] [] []                   Flexibility Normal Left tight Right tight   Hip flexor [] [] []   quad [] [] []   HS [] [] []   piriformis [] [] [x]   ITB [] [] [x]   gastroc [] [] []   Soleus  [] [] []    [] [] []    [] [] []                             Functional Test: LEFS Score: 49% functionally impaired         Assessment:  Patient would benefit from skilled physical therapy services in order to: Decrease tightness in fascia surrounding IT band, glut med and piriformis contributing to bursitis of R hip. Pt will benefit from strengthening once pain has decreased and symptoms of subsided to prevent return of symptoms. Goals:        STG: (to be met in 10 treatments)  1. ? Pain: Decrease pain levels to 5/10 at worst in R hip   2. ? ROM: Increase flexibility and AROM limitations throughout to equal bilat to reduce difficulty with ADLs, symterical hip rotation R compared to L demonstrating improvement in R piriformis tightness  3. ? Strength: Increase R hip abd strength to 4/5  4. ? Function: Pt to report completing a full work day without any pain   5. Independent with Home Exercise Programs    LTG: (to be met in 20 treatments)  1. Improve score on assessment tool from 49% impaired to 20% impaired, demonstrating an improvement in ADLs  2. Reduce pain levels to 0/10  3. Pt ot have increased R hip abd strength to 5/5                   Patient goals:  To have less hip pain     Rehab Potential:  [x] Good  [] Fair  [] Poor   Suggested Professional Referral:  [x] No  [] Yes:  Barriers to Goal Achievement[de-identified]  [x] No  [] Yes:  Domestic Concerns:  [x] No  [] Yes:    Pt. Education:  [x] Plans/Goals, Risks/Benefits discussed  [x] Home exercise program    Method of Education: [x] Verbal  [x] Demo  [x] Written  Comprehension of Education:  [x] Verbalizes understanding. [x] Demonstrates understanding. [x] Needs Review. [] Demonstrates/verbalizes understanding of HEP/Ed previously given. Treatment Plan:  [x] Therapeutic Exercise    [] Aquatic Therapy  [x] Manual Therapy   [] Electrical Stimulation  [x] Instruction in HEP      [] Lumbar/Cervical Traction  [x] Neuromuscular Re-education [x] Cold/hotpack  [] Iontophoresis: 4 mg/mL  [x] Vasocompression (GameReady)                    Dexamethasone Sodium  [] Gait Training             Phosphate 40-80 mAmin  [x] Integrative Dry Needling         []  Medication allergies reviewed for use of    Dexamethasone Sodium Phosphate 4mg/ml     with iontophoresis treatments. Pt is not allergic. Frequency:  2 x/week for 20 visits    Todays Treatment:    Exercises:  Exercise  R hip bursitis    Reps/ Time Weight/ Level Comments               *Foam roll fascia surrounding IT band, R piriformis                                                                              Other:    Manual: Gentle MFR to fascia surrounding IT band, glut med and piriformis  Hip distraction in supine with knees flexed    Integrative Dry Needling: R lateral thigh, R glut med.   Initiated Dry Needling this date with all risks and benefits explained, no adverse effects noted post.     Specific Instructions for next treatment:    Evaluation Complexity:  History (Personal factors, comorbidities) [x] 0 [] 1-2 [] 3+   Exam (limitations, restrictions) [x] 1-2 [] 3 [] 4+   Clinical presentation (progression) [x] Stable [] Evolving  [] Unstable   Decision Making [x] Low [] Moderate [] High    [x] Low Complexity [] Moderate Complexity [] High Complexity       Treatment Charges: Mins Units   [x] Evaluation       [x]  Low       []  Moderate       []  High 15 1   []  Modalities     []  Ther Exercise     [x]  Manual Therapy 30 2   []  Ther Activities     []  Aquatics     [] Vasocompression     [x]  Dry Needling 5 0     TOTAL TREATMENT TIME: 50 minutes    Time in: 5105   Time Out: 1825    Electronically signed by: Barbara Savage PT        Physician Signature:________________________________Date:__________________  By signing above or cosigning this note, I have reviewed this plan of care and certify a need for medically necessary rehabilitation services.      *PLEASE SIGN ABOVE AND FAX BACK ALL PAGES*

## 2021-06-28 ENCOUNTER — HOSPITAL ENCOUNTER (OUTPATIENT)
Dept: PHYSICAL THERAPY | Facility: CLINIC | Age: 51
Setting detail: THERAPIES SERIES
Discharge: HOME | End: 2021-06-28
Payer: COMMERCIAL

## 2021-06-28 NOTE — FLOWSHEET NOTE
[] [facility]) [facility] &  Therapy  [address]    [phone]  F: [phone]   [] [address]  [facility]   [address]  P: [phone]  F: [phone]  [] [place]  [address]  P: [phone]  F: [phone] [] [address]  P: [phone]  F: [phone]  [] [address]  [address] [facility] [address]   [address]   [place]: [phone]  F: [phone]   [] [facility]  [address] [facility] [address]  [place]: [phone]   F: [phone]     Physical Therapy Cancel/No Show note    Date: 2021  Patient: Bennie Mendoza  : 1970  MRN: [id number]    Cancels/No Shows to date:     For today's appointment patient:    [x]  Cancelled    [] Rescheduled appointment    [] No-show     Reason given by patient:    []  Patient ill    []  Conflicting appointment    [] No transportation      [] Conflict with work    [] No reason given    [] Weather related    [] COVID-19    [x] Other:      Comments: Pt was called into work last night and just woke up. Confirmed next appt.         [] Next appointment was confirmed    Electronically signed by: Vanessa Haas PT

## 2021-06-30 ENCOUNTER — HOSPITAL ENCOUNTER (OUTPATIENT)
Dept: PHYSICAL THERAPY | Facility: CLINIC | Age: 51
Setting detail: THERAPIES SERIES
Discharge: HOME | End: 2021-06-30
Payer: COMMERCIAL

## 2021-06-30 PROCEDURE — 97110 THERAPEUTIC EXERCISES: CPT

## 2021-06-30 NOTE — FLOWSHEET NOTE
[] [address]  [address]  [phone]  F: [phone] [] [address]  [facility]   [address]  P: [phone]  F: [phone] [] [address]  Therapy  [address] [facility]  P: [phone]  F: [phone] [x] [address]  P: [phone]  F: [phone] [] [address]  [address] [facility]   [address]   [place]: [phone]  F: [phone]      Physical Therapy Daily Treatment Note    Date:  2021  Patient Name:  Ernesto Peters    :  1970  MRN: [id number]  Physician: Dr. Noni Duong: [facility] [place] ([facility])  Medical Diagnosis: Greater trochanteric bursitis RLE                     Rehab Codes: M25.551  Onset date: 10/1/20               Next 's appt.: TBA  Visit# / total visits:      Cancels/No Shows: 1/0    Subjective:    Pain:  [] Yes  [] No Location: R hip  Pain Rating: (0-10 scale) 4-5/10  Pain altered Tx:  [] No  [] Yes  Action:  Comments: Pt arrives having a \"bad day\", increased pain at work.  Does state to having a decrease in pain after last visit for approx 2-3 days     Objective:     Exercises:  Exercise  R hip bursitis     Reps/ Time Weight/ Level Comments                       *Foam roll fascia surrounding IT band, R piriformis                                                                                                                                 Other: MHP to R lateral thigh in sidelying- 10'     Manual: Gentle MFR to fascia surrounding IT band, glut med and piriformis  Hip distraction in supine with knees flexed- not today      Integrative Dry Needling: R lateral thigh, R glut med- increased tenderness noted this date      Specific Instructions for next HEP/Ed  Method of Education: [x] Verbal  [] Demo  [] Written  Comprehension of Education:  [x] Verbalizes understanding. [x] Demonstrates understanding. [] Needs review. [] Demonstrates/verbalizes HEP/Ed previously given. Plan: [x] Continue current frequency toward long and short term goals.           Time In: 1620            Time Out: 1710    Electronically signed by:  Art Rodgers PT

## 2021-07-07 ENCOUNTER — HOSPITAL ENCOUNTER (OUTPATIENT)
Dept: PHYSICAL THERAPY | Facility: CLINIC | Age: 51
Setting detail: THERAPIES SERIES
Discharge: HOME | End: 2021-07-07
Payer: COMMERCIAL

## 2021-07-07 PROCEDURE — 97140 MANUAL THERAPY 1/> REGIONS: CPT

## 2021-07-07 PROCEDURE — 97110 THERAPEUTIC EXERCISES: CPT

## 2021-07-07 NOTE — FLOWSHEET NOTE
increased tenderness noted this date      Specific Instructions for next treatment: Continue with manual as listed above, progress to strengthening if pt arrives with decreased pain       Treatment Charges: Mins Units   []  Modalities:     [x]  Ther Exercise 10 1   [x]  Manual Therapy 25 2   []  Ther Activities     []  Aquatics     []  Vasocompression     [x]  Other: Dry Needling 5 0   Total Treatment time 40 3       Assessment: [x] Progressing toward goals. Initiated treatment with Dry Needling which was more tolerable for patient compared to previous visit. Followed DN with manual to ITband fascia, glut med and piriformis, followed by light strengthening as noted above. Pt with poor tolerance of sidelying clamshells with increased pain throughout; able to tolerate anti-gravity clamshells. Ended with foam roll for pain control    [] No change. [] Other:  [] Patient would continue to benefit from skilled physical therapy services in order to:  Decrease tightness in fascia surrounding IT band, glut med and piriformis contributing to bursitis of R hip. Pt will benefit from strengthening once pain has decreased and symptoms of subsided to prevent return of symptoms.            Goals:                               STG: (to be met in 10 treatments)  1. ? Pain: Decrease pain levels to 5/10 at worst in R hip   2. ? ROM: Increase flexibility and AROM limitations throughout to equal bilat to reduce difficulty with ADLs, symterical hip rotation R compared to L demonstrating improvement in R piriformis tightness  3. ? Strength: Increase R hip abd strength to 4/5  4. ? Function: Pt to report completing a full work day without any pain   5. Independent with Home Exercise Programs     LTG: (to be met in 20 treatments)  1. Improve score on assessment tool from 49% impaired to 20% impaired, demonstrating an improvement in ADLs  2. Reduce pain levels to 0/10  3.  Pt ot have increased R hip abd strength to 5/5    Patient goals: To have less hip pain     Pt. Education:  [x] Yes  [] No  [] Reviewed Prior HEP/Ed  Method of Education: [x] Verbal  [] Demo  [] Written  Comprehension of Education:  [x] Verbalizes understanding. [x] Demonstrates understanding. [] Needs review. [] Demonstrates/verbalizes HEP/Ed previously given. Plan: [x] Continue current frequency toward long and short term goals.           Time In: 1740            Time Out: [address]    Electronically signed by:  Raymundo Kilgore PT

## 2021-07-21 ENCOUNTER — HOSPITAL ENCOUNTER (OUTPATIENT)
Dept: PHYSICAL THERAPY | Facility: CLINIC | Age: 51
Setting detail: THERAPIES SERIES
Discharge: HOME | End: 2021-07-21
Payer: COMMERCIAL

## 2021-07-21 PROCEDURE — 97110 THERAPEUTIC EXERCISES: CPT

## 2021-07-21 PROCEDURE — 97140 MANUAL THERAPY 1/> REGIONS: CPT

## 2021-07-21 NOTE — FLOWSHEET NOTE
[] [facility]) - [facility]  Therapy  [address]  [phone]  F: [phone] [] [address]  [employer] [address]   [address]  P: [phone]  F: [phone] [] [address]  Therapy  [address]  P: [phone]  F: [phone] [x] [address]  P: [phone]  F: [phone] [] [address]  [facility]   [address]   [place]: [phone]  F: [phone]      Physical Therapy Daily Treatment Note    Date:  2021  Patient Name:  Selma Matthews    :  1970  MRN: [id number]  Physician: Dr. Fatoumata Burkett: [facility] [place] ([address] [place] [address])  Medical Diagnosis: Greater trochanteric bursitis RLE                     Rehab Codes: M25.551  Onset date: 10/1/20               Next 's appt.: TBA  Visit# / total visits:      Cancels/No Shows: 1/0    Subjective:    Pain:  [] Yes  [] No Location: R hip  Pain Rating: (0-10 scale) 2/10  Pain altered Tx:  [] No  [] Yes  Action:  Comments: Pt arrives with minimal pain, states to feeling \"really good\" overall.      Objective:     Exercises:  Exercise  R hip bursitis     Reps/ Time Weight/ Level Comments                       *Foam roll fascia surrounding IT band, R piriformis                              Sidelying clamshell 2x10      Sidelying hip abd 2x10      Bridges 2x10                                                                           Other: MHP to R lateral thigh in sidelying- 10'- not today      Manual: Gentle MFR to fascia surrounding IT band, glut med and piriformis  Hip distraction in supine with knees flexed- not today      Integrative Dry Needling: R lateral thigh, R glut med- increased tenderness noted this date      Specific Instructions for next treatment: Continue with manual as listed above, progress to strengthening if pt arrives with decreased pain       Treatment Charges: Mins Units   []  Modalities:     [x]  Ther Exercise 10 1   [x]  Manual Therapy 25 2   []  Ther Activities     []  Aquatics     []  Vasocompression     [x]  Other: Dry Needling 5 0   Total Treatment time 40 3       Assessment: [x] Progressing toward goals. Initiated treatment with Dry Needling followed by MFR to fascia surrounding IT band, pain initially increased however decreased significantly following foam roller. Pt then able to continue with exercises, noting progression in strength via now able to toelrate 2 sets of clamshells as well as addition of Bridges and side lying hip abd without any increase in pain. Pt ended treatment stating this is \"the best I've ever felt\". [] No change. [] Other:  [] Patient would continue to benefit from skilled physical therapy services in order to:  Decrease tightness in fascia surrounding IT band, glut med and piriformis contributing to bursitis of R hip. Pt will benefit from strengthening once pain has decreased and symptoms of subsided to prevent return of symptoms.            Goals:                               STG: (to be met in 10 treatments)  1. ? Pain: Decrease pain levels to 5/10 at worst in R hip   2. ? ROM: Increase flexibility and AROM limitations throughout to equal bilat to reduce difficulty with ADLs, symterical hip rotation R compared to L demonstrating improvement in R piriformis tightness  3. ? Strength: Increase R hip abd strength to 4/5  4. ? Function: Pt to report completing a full work day without any pain   5. Independent with Home Exercise Programs     LTG: (to be met in 20 treatments)  1. Improve score on assessment tool from 49% impaired to 20% impaired, demonstrating an improvement in ADLs  2. Reduce pain levels to 0/10  3. Pt ot have increased R hip abd strength to 5/5                    Patient goals:  To have less hip pain     Pt. Education:  [x] Yes  [] No  [] Reviewed Prior HEP/Ed  Method of Education: [x] Verbal  [] Demo  [] Written  Comprehension of Education:  [x] Verbalizes understanding. [x] Demonstrates understanding. [] Needs review. [] Demonstrates/verbalizes HEP/Ed previously given. Plan: [x] Continue current frequency toward long and short term goals.           Time In: 1605            Time Out: [address]    Electronically signed by:  Tiana Kinney PT

## 2021-08-05 ENCOUNTER — HOSPITAL ENCOUNTER (OUTPATIENT)
Dept: PHYSICAL THERAPY | Facility: CLINIC | Age: 51
Setting detail: THERAPIES SERIES
Discharge: HOME | End: 2021-08-05
Payer: COMMERCIAL

## 2021-08-13 ENCOUNTER — OFFICE VISIT (OUTPATIENT)
Dept: FAMILY MEDICINE CLINIC | Age: 51
End: 2021-08-13
Payer: COMMERCIAL

## 2021-08-13 ENCOUNTER — HOSPITAL ENCOUNTER (OUTPATIENT)
Age: 51
Setting detail: SPECIMEN
Discharge: HOME | End: 2021-08-13
Payer: COMMERCIAL

## 2021-08-13 VITALS
HEART RATE: 71 BPM | DIASTOLIC BLOOD PRESSURE: 66 MMHG | HEIGHT: 61 IN | OXYGEN SATURATION: 99 % | SYSTOLIC BLOOD PRESSURE: 116 MMHG | BODY MASS INDEX: 30.78 KG/M2 | WEIGHT: 163 LBS

## 2021-08-13 DIAGNOSIS — R23.2 HOT FLASHES: Primary | ICD-10-CM

## 2021-08-13 DIAGNOSIS — R45.86 MOOD SWINGS: ICD-10-CM

## 2021-08-13 DIAGNOSIS — R23.2 HOT FLASHES: ICD-10-CM

## 2021-08-13 DIAGNOSIS — R63.5 WEIGHT GAIN: ICD-10-CM

## 2021-08-13 LAB
ESTRADIOL LEVEL: <5 PG/ML (ref 27–314)
FERRITIN: 97 UG/L (ref 13–150)
FOLLICLE STIMULATING HORMONE: 64 U/L (ref 1.7–21.5)
LH: 36 U/L (ref 1–95.6)
PROGESTERONE LEVEL: <0.05 NG/ML
TSH SERPL DL<=0.05 MIU/L-ACNC: 3.26 MIU/L (ref 0.3–5)

## 2021-08-13 PROCEDURE — 99214 OFFICE O/P EST MOD 30 MIN: CPT | Performed by: NURSE PRACTITIONER

## 2021-08-13 SDOH — ECONOMIC STABILITY: FOOD INSECURITY: WITHIN THE PAST 12 MONTHS, YOU WORRIED THAT YOUR FOOD WOULD RUN OUT BEFORE YOU GOT MONEY TO BUY MORE.: NEVER TRUE

## 2021-08-13 SDOH — ECONOMIC STABILITY: FOOD INSECURITY: WITHIN THE PAST 12 MONTHS, THE FOOD YOU BOUGHT JUST DIDN'T LAST AND YOU DIDN'T HAVE MONEY TO GET MORE.: NEVER TRUE

## 2021-08-13 ASSESSMENT — SOCIAL DETERMINANTS OF HEALTH (SDOH): HOW HARD IS IT FOR YOU TO PAY FOR THE VERY BASICS LIKE FOOD, HOUSING, MEDICAL CARE, AND HEATING?: NOT HARD AT ALL

## 2021-08-13 ASSESSMENT — ENCOUNTER SYMPTOMS
COLOR CHANGE: 0
BACK PAIN: 0
SORE THROAT: 0
RHINORRHEA: 0
ABDOMINAL DISTENTION: 0
DIARRHEA: 0
SHORTNESS OF BREATH: 0
ABDOMINAL PAIN: 0
CHEST TIGHTNESS: 0
CONSTIPATION: 0
NAUSEA: 0
COUGH: 0

## 2021-08-13 NOTE — PROGRESS NOTES
Yuri Rivas, APRN-CNP  [address] [facility]  [address] [address]  [address] [address]  Dept: [phone]  Dept Fax: [phone]     Patient ID: Julieth Gamble is a 49 y.o. female Established patient    HPI    Pt here today for an acute visit for hot flashes and mood swings. She has noticed her mood being more depressed she is still taking Effexor as prescribed. For instance last night she had 3 fans on her and was dripping. It comes during work and she is  Dripping after procedures. - she has not had period in a while     - she would like to try something else for weight loss since she can not take adipx for a few more months.   - discussed wegovy and submitted online application to check insurance coverage. Pt denies any fever or chills. Pt today denies any HA, chest pain, or SOB. Pt denies any N/V/D/C or abdominal pain. Otherwise pt doing well on current tx and no other concerns today. The patient's past medical, surgical, social, and family history as well as his current medications and allergies were reviewed as documented in today's encounter [place], [place]. Previous office notes, labs, imaging and hospital records were reviewed prior to and during encounter. Current Outpatient Medications on File Prior to Visit   Medication Sig Dispense Refill    meloxicam (MOBIC) 15 MG tablet Take 1 tablet by mouth daily as needed for Pain 90 tablet 2    venlafaxine (EFFEXOR XR) 75 MG extended release capsule Take 1 capsule by mouth daily 30 capsule 3     No current facility-administered medications on file prior to visit. Subjective:     Review of Systems   Constitutional: Positive for diaphoresis (hot flashes). Negative for activity change, fatigue and fever. HENT: Negative for congestion, ear pain, rhinorrhea and sore throat. Respiratory: Negative for cough, chest tightness and shortness of breath.     Cardiovascular: Negative for chest pain and palpitations. Gastrointestinal: Negative for abdominal distention, abdominal pain, constipation, diarrhea and nausea. Endocrine: Negative for polydipsia, polyphagia and polyuria. Genitourinary: Negative for difficulty urinating and dysuria. Musculoskeletal: Positive for myalgias (hip is improving with current tx). Negative for arthralgias and back pain. Skin: Negative for color change and rash. Neurological: Negative for dizziness, weakness, light-headedness and headaches. Hematological: Negative for adenopathy. Psychiatric/Behavioral: Positive for dysphoric mood and sleep disturbance (due to hot flashes). Negative for agitation and behavioral problems. The patient is not nervous/anxious. Vitals:    08/13/21 1024   BP: 116/66   Pulse: 71   SpO2: 99%       Objective:     Physical Exam  Vitals reviewed. Constitutional:       General: She is not in acute distress. Appearance: Normal appearance. HENT:      Head: Normocephalic and atraumatic. Right Ear: External ear normal.      Left Ear: External ear normal.      Nose: Nose normal.      Mouth/Throat:      Mouth: Mucous membranes are moist.      Pharynx: No oropharyngeal exudate or posterior oropharyngeal erythema. Eyes:      Extraocular Movements: Extraocular movements intact. Conjunctiva/sclera: Conjunctivae normal.      Pupils: Pupils are equal, round, and reactive to light. Cardiovascular:      Rate and Rhythm: Normal rate and regular rhythm. Pulses: Normal pulses. Heart sounds: Normal heart sounds. No murmur heard. Pulmonary:      Effort: Pulmonary effort is normal. No respiratory distress. Breath sounds: Normal breath sounds. No wheezing or rales. Musculoskeletal:         General: Normal range of motion. Cervical back: Normal range of motion. Right lower leg: No edema. Left lower leg: No edema. Lymphadenopathy:      Cervical: No cervical adenopathy.    Skin:     General: Skin is warm and dry. Neurological:      General: No focal deficit present. Mental Status: She is alert and oriented to person, place, and time. Deep Tendon Reflexes: Reflexes normal.   Psychiatric:         Mood and Affect: Mood is anxious and depressed (stable). Behavior: Behavior normal. Behavior is cooperative. Assessment:      Diagnosis Orders   1. Hot flashes  Estradiol    Estrone    Follicle Stimulating Hormone    Luteinizing Hormone    Progesterone    Ferritin    DHEA-Sulfate    TSH with Reflex    Hemoglobin A1C   2. Mood swings  Estradiol    Estrone    Follicle Stimulating Hormone    Luteinizing Hormone    Progesterone    Ferritin    DHEA-Sulfate    TSH with Reflex    Hemoglobin A1C   3. Weight gain  Estradiol    Estrone    Follicle Stimulating Hormone    Luteinizing Hormone    Progesterone    Ferritin    DHEA-Sulfate    TSH with Reflex    Hemoglobin A1C       Plan:     - will get hormone panel to further address her menopausal symptoms and discussed [facility] depending on labs. - submitted online form for wegovy in regards to weight loss. - will await to see if coverage from insurance- pt to call if she hears back and I will contact her if I receive anything in email as provider.   - Lifestyle changes discussed and encouraged: Decrease fats, sugars, carbohydrates, and increase routine exercise  - I did instruct pt on trying to get 150 minutes of aerobic activity a week and watch calorie portions and to limit her carbs in her diet. - pt verbalized understanding plan of care. Medications, labs, diagnostic studies, consultations and follow-up as documented in this encounter.  Rest of systems unchanged, continue current treatments    On this date 8/13/2021 I have spent 30 minutes reviewing previous notes, test results and face to face with the patient discussing the diagnosis and importance of compliance with the treatment plan as well as documenting on the day of the visit.    Lenwood Merlin, APRN-CNP

## 2021-08-15 LAB
ESTIMATED AVERAGE GLUCOSE: 105 MG/DL
HBA1C MFR BLD: 5.3 % (ref 4–6)

## 2021-08-17 LAB
DHEAS (DHEA SULFATE): 112 UG/DL (ref 26–200)
ESTRONE: 12.1 PG/ML

## 2021-08-23 ENCOUNTER — TELEPHONE (OUTPATIENT)
Dept: FAMILY MEDICINE CLINIC | Age: 51
End: 2021-08-23

## 2021-08-23 NOTE — TELEPHONE ENCOUNTER
Patient asking about a copy of her vaccination records. (mentioned something about the website \"[employer]\")       Patient would like a call back from clinical regarding this. Please advise.

## 2021-09-07 NOTE — PROGRESS NOTES
Preoperative Instructions:    Stop eating solid foods at midnight the night prior to your surgery. Stop drinking clear liquids at midnight the night prior to your surgery. Arrive at the surgery center (3rd entrance) on __________9/16_____ by ___________1100____. Please stop any blood thinning medications as directed by your surgeon or prescribing physician. Failure to stop certain medications may interfere with your scheduled surgery. These may include: Aspirin, Coumadin, Plavix, NSAIDS (Motrin, Aleve, Advil, Mobic, Celebrex), Eliquis, Pradaxa, Xarelto, Fish oil, and herbal supplements. You may continue the rest of your medications through the night before surgery unless instructed otherwise. .      Reminders:  -If you are going home the day of your procedure, you will need a family member or friend to stay during the procedure and drive you home after your procedure. Your  must be 25years of age or older and able to sign off on your discharge instructions.    -If you are going home the same day of your surgery, someone must remain with you for the first 24 hours after your surgery if you receive sedation or anesthesia.      -Please do not wear any jewelery or body piercing the day of surgery

## 2021-09-15 ENCOUNTER — ANESTHESIA EVENT (OUTPATIENT)
Dept: OPERATING ROOM | Age: 51
End: 2021-09-15
Payer: COMMERCIAL

## 2021-09-16 ENCOUNTER — HOSPITAL ENCOUNTER (OUTPATIENT)
Age: 51
Setting detail: OUTPATIENT SURGERY
Discharge: HOME | End: 2021-09-16
Attending: PLASTIC SURGERY | Admitting: PLASTIC SURGERY
Payer: COMMERCIAL

## 2021-09-16 ENCOUNTER — ANESTHESIA (OUTPATIENT)
Dept: OPERATING ROOM | Age: 51
End: 2021-09-16
Payer: COMMERCIAL

## 2021-09-16 VITALS
TEMPERATURE: 97.3 F | SYSTOLIC BLOOD PRESSURE: 120 MMHG | RESPIRATION RATE: 14 BRPM | HEIGHT: 61 IN | DIASTOLIC BLOOD PRESSURE: 79 MMHG | HEART RATE: 70 BPM | WEIGHT: 165.5 LBS | OXYGEN SATURATION: 100 % | BODY MASS INDEX: 31.25 KG/M2

## 2021-09-16 VITALS
DIASTOLIC BLOOD PRESSURE: 51 MMHG | RESPIRATION RATE: 10 BRPM | OXYGEN SATURATION: 96 % | SYSTOLIC BLOOD PRESSURE: 95 MMHG | TEMPERATURE: 95.9 F

## 2021-09-16 DIAGNOSIS — G89.18 PAIN FOLLOWING SURGERY OR PROCEDURE: Primary | ICD-10-CM

## 2021-09-16 LAB — HCG, PREGNANCY URINE (POC): NEGATIVE

## 2021-09-16 PROCEDURE — 88304 TISSUE EXAM BY PATHOLOGIST: CPT

## 2021-09-16 PROCEDURE — 7100000010 HC PHASE II RECOVERY - FIRST 15 MIN: Performed by: PLASTIC SURGERY

## 2021-09-16 PROCEDURE — 7100000011 HC PHASE II RECOVERY - ADDTL 15 MIN: Performed by: PLASTIC SURGERY

## 2021-09-16 PROCEDURE — 6360000002 HC RX W HCPCS: Performed by: SPECIALIST

## 2021-09-16 PROCEDURE — 2580000003 HC RX 258: Performed by: ANESTHESIOLOGY

## 2021-09-16 PROCEDURE — 2580000003 HC RX 258: Performed by: SPECIALIST

## 2021-09-16 PROCEDURE — 81025 URINE PREGNANCY TEST: CPT

## 2021-09-16 PROCEDURE — 3700000001 HC ADD 15 MINUTES (ANESTHESIA): Performed by: PLASTIC SURGERY

## 2021-09-16 PROCEDURE — 2500000003 HC RX 250 WO HCPCS: Performed by: PLASTIC SURGERY

## 2021-09-16 PROCEDURE — 2709999900 HC NON-CHARGEABLE SUPPLY: Performed by: PLASTIC SURGERY

## 2021-09-16 PROCEDURE — 3700000000 HC ANESTHESIA ATTENDED CARE: Performed by: PLASTIC SURGERY

## 2021-09-16 PROCEDURE — 3600000002 HC SURGERY LEVEL 2 BASE: Performed by: PLASTIC SURGERY

## 2021-09-16 PROCEDURE — 3600000012 HC SURGERY LEVEL 2 ADDTL 15MIN: Performed by: PLASTIC SURGERY

## 2021-09-16 RX ORDER — PROPOFOL 10 MG/ML
INJECTION, EMULSION INTRAVENOUS PRN
Status: DISCONTINUED | OUTPATIENT
Start: 2021-09-16 | End: 2021-09-16 | Stop reason: SDUPTHER

## 2021-09-16 RX ORDER — SODIUM CHLORIDE 0.9 % (FLUSH) 0.9 %
10 SYRINGE (ML) INJECTION PRN
Status: DISCONTINUED | OUTPATIENT
Start: 2021-09-16 | End: 2021-09-16 | Stop reason: HOSPADM

## 2021-09-16 RX ORDER — OXYCODONE HYDROCHLORIDE AND ACETAMINOPHEN 5; 325 MG/1; MG/1
1 TABLET ORAL EVERY 6 HOURS PRN
Qty: 20 TABLET | Refills: 0 | Status: SHIPPED | OUTPATIENT
Start: 2021-09-16 | End: 2021-09-21

## 2021-09-16 RX ORDER — OXYCODONE HYDROCHLORIDE AND ACETAMINOPHEN 5; 325 MG/1; MG/1
2 TABLET ORAL PRN
Status: DISCONTINUED | OUTPATIENT
Start: 2021-09-16 | End: 2021-09-16 | Stop reason: HOSPADM

## 2021-09-16 RX ORDER — MORPHINE SULFATE 2 MG/ML
2 INJECTION, SOLUTION INTRAMUSCULAR; INTRAVENOUS EVERY 5 MIN PRN
Status: DISCONTINUED | OUTPATIENT
Start: 2021-09-16 | End: 2021-09-16 | Stop reason: HOSPADM

## 2021-09-16 RX ORDER — MIDAZOLAM HYDROCHLORIDE 1 MG/ML
INJECTION INTRAMUSCULAR; INTRAVENOUS PRN
Status: DISCONTINUED | OUTPATIENT
Start: 2021-09-16 | End: 2021-09-16 | Stop reason: SDUPTHER

## 2021-09-16 RX ORDER — PROPOFOL 10 MG/ML
INJECTION, EMULSION INTRAVENOUS
Status: DISCONTINUED
Start: 2021-09-16 | End: 2021-09-16 | Stop reason: HOSPADM

## 2021-09-16 RX ORDER — BUPIVACAINE HYDROCHLORIDE 2.5 MG/ML
INJECTION, SOLUTION INFILTRATION; PERINEURAL PRN
Status: DISCONTINUED | OUTPATIENT
Start: 2021-09-16 | End: 2021-09-16 | Stop reason: ALTCHOICE

## 2021-09-16 RX ORDER — MEPERIDINE HYDROCHLORIDE 50 MG/ML
12.5 INJECTION INTRAMUSCULAR; INTRAVENOUS; SUBCUTANEOUS EVERY 5 MIN PRN
Status: DISCONTINUED | OUTPATIENT
Start: 2021-09-16 | End: 2021-09-16 | Stop reason: HOSPADM

## 2021-09-16 RX ORDER — SODIUM CHLORIDE 9 MG/ML
25 INJECTION, SOLUTION INTRAVENOUS PRN
Status: DISCONTINUED | OUTPATIENT
Start: 2021-09-16 | End: 2021-09-16 | Stop reason: HOSPADM

## 2021-09-16 RX ORDER — HYDRALAZINE HYDROCHLORIDE 20 MG/ML
10 INJECTION INTRAMUSCULAR; INTRAVENOUS EVERY 10 MIN PRN
Status: DISCONTINUED | OUTPATIENT
Start: 2021-09-16 | End: 2021-09-16 | Stop reason: HOSPADM

## 2021-09-16 RX ORDER — MIDAZOLAM HYDROCHLORIDE 2 MG/2ML
1 INJECTION, SOLUTION INTRAMUSCULAR; INTRAVENOUS ONCE
Status: DISCONTINUED | OUTPATIENT
Start: 2021-09-16 | End: 2021-09-16 | Stop reason: HOSPADM

## 2021-09-16 RX ORDER — FENTANYL CITRATE 50 UG/ML
INJECTION, SOLUTION INTRAMUSCULAR; INTRAVENOUS PRN
Status: DISCONTINUED | OUTPATIENT
Start: 2021-09-16 | End: 2021-09-16 | Stop reason: SDUPTHER

## 2021-09-16 RX ORDER — SODIUM CHLORIDE 0.9 % (FLUSH) 0.9 %
10 SYRINGE (ML) INJECTION EVERY 12 HOURS SCHEDULED
Status: DISCONTINUED | OUTPATIENT
Start: 2021-09-16 | End: 2021-09-16 | Stop reason: HOSPADM

## 2021-09-16 RX ORDER — SODIUM CHLORIDE, SODIUM LACTATE, POTASSIUM CHLORIDE, CALCIUM CHLORIDE 600; 310; 30; 20 MG/100ML; MG/100ML; MG/100ML; MG/100ML
INJECTION, SOLUTION INTRAVENOUS CONTINUOUS
Status: DISCONTINUED | OUTPATIENT
Start: 2021-09-16 | End: 2021-09-16 | Stop reason: HOSPADM

## 2021-09-16 RX ORDER — PROMETHAZINE HYDROCHLORIDE 25 MG/ML
6.25 INJECTION, SOLUTION INTRAMUSCULAR; INTRAVENOUS
Status: DISCONTINUED | OUTPATIENT
Start: 2021-09-16 | End: 2021-09-16 | Stop reason: HOSPADM

## 2021-09-16 RX ORDER — DIPHENHYDRAMINE HYDROCHLORIDE 50 MG/ML
12.5 INJECTION INTRAMUSCULAR; INTRAVENOUS
Status: DISCONTINUED | OUTPATIENT
Start: 2021-09-16 | End: 2021-09-16 | Stop reason: HOSPADM

## 2021-09-16 RX ORDER — CEFAZOLIN SODIUM 1 G/3ML
INJECTION, POWDER, FOR SOLUTION INTRAMUSCULAR; INTRAVENOUS PRN
Status: DISCONTINUED | OUTPATIENT
Start: 2021-09-16 | End: 2021-09-16 | Stop reason: SDUPTHER

## 2021-09-16 RX ORDER — 0.9 % SODIUM CHLORIDE 0.9 %
500 INTRAVENOUS SOLUTION INTRAVENOUS
Status: DISCONTINUED | OUTPATIENT
Start: 2021-09-16 | End: 2021-09-16 | Stop reason: HOSPADM

## 2021-09-16 RX ORDER — LIDOCAINE HYDROCHLORIDE 10 MG/ML
1 INJECTION, SOLUTION EPIDURAL; INFILTRATION; INTRACAUDAL; PERINEURAL
Status: DISCONTINUED | OUTPATIENT
Start: 2021-09-16 | End: 2021-09-16 | Stop reason: HOSPADM

## 2021-09-16 RX ORDER — LABETALOL 20 MG/4 ML (5 MG/ML) INTRAVENOUS SYRINGE
10 EVERY 10 MIN PRN
Status: DISCONTINUED | OUTPATIENT
Start: 2021-09-16 | End: 2021-09-16 | Stop reason: HOSPADM

## 2021-09-16 RX ORDER — GINSENG 100 MG
CAPSULE ORAL
Status: DISCONTINUED
Start: 2021-09-16 | End: 2021-09-16 | Stop reason: WASHOUT

## 2021-09-16 RX ORDER — BUPIVACAINE HYDROCHLORIDE 2.5 MG/ML
INJECTION, SOLUTION INFILTRATION; PERINEURAL
Status: DISCONTINUED
Start: 2021-09-16 | End: 2021-09-16 | Stop reason: HOSPADM

## 2021-09-16 RX ORDER — OXYCODONE HYDROCHLORIDE AND ACETAMINOPHEN 5; 325 MG/1; MG/1
1 TABLET ORAL PRN
Status: DISCONTINUED | OUTPATIENT
Start: 2021-09-16 | End: 2021-09-16 | Stop reason: HOSPADM

## 2021-09-16 RX ORDER — CEPHALEXIN 500 MG/1
500 CAPSULE ORAL 3 TIMES DAILY
Qty: 21 CAPSULE | Refills: 0 | Status: SHIPPED | OUTPATIENT
Start: 2021-09-16 | End: 2021-09-23

## 2021-09-16 RX ORDER — ONDANSETRON 2 MG/ML
4 INJECTION INTRAMUSCULAR; INTRAVENOUS
Status: DISCONTINUED | OUTPATIENT
Start: 2021-09-16 | End: 2021-09-16 | Stop reason: HOSPADM

## 2021-09-16 RX ORDER — SODIUM CHLORIDE, SODIUM LACTATE, POTASSIUM CHLORIDE, CALCIUM CHLORIDE 600; 310; 30; 20 MG/100ML; MG/100ML; MG/100ML; MG/100ML
INJECTION, SOLUTION INTRAVENOUS CONTINUOUS PRN
Status: DISCONTINUED | OUTPATIENT
Start: 2021-09-16 | End: 2021-09-16 | Stop reason: SDUPTHER

## 2021-09-16 RX ADMIN — PROPOFOL INJECTABLE EMULSION 50 MG: 10 INJECTION, EMULSION INTRAVENOUS at 12:00

## 2021-09-16 RX ADMIN — PROPOFOL INJECTABLE EMULSION 50 MG: 10 INJECTION, EMULSION INTRAVENOUS at 12:08

## 2021-09-16 RX ADMIN — CEFAZOLIN 2000 MG: 1 INJECTION, POWDER, FOR SOLUTION INTRAMUSCULAR; INTRAVENOUS at 12:18

## 2021-09-16 RX ADMIN — PROPOFOL INJECTABLE EMULSION 50 MG: 10 INJECTION, EMULSION INTRAVENOUS at 12:03

## 2021-09-16 RX ADMIN — PROPOFOL INJECTABLE EMULSION 50 MG: 10 INJECTION, EMULSION INTRAVENOUS at 12:12

## 2021-09-16 RX ADMIN — FENTANYL CITRATE 25 MCG: 50 INJECTION, SOLUTION INTRAMUSCULAR; INTRAVENOUS at 12:25

## 2021-09-16 RX ADMIN — SODIUM CHLORIDE, POTASSIUM CHLORIDE, SODIUM LACTATE AND CALCIUM CHLORIDE: 600; 310; 30; 20 INJECTION, SOLUTION INTRAVENOUS at 11:55

## 2021-09-16 RX ADMIN — SODIUM CHLORIDE, POTASSIUM CHLORIDE, SODIUM LACTATE AND CALCIUM CHLORIDE: 600; 310; 30; 20 INJECTION, SOLUTION INTRAVENOUS at 11:41

## 2021-09-16 RX ADMIN — FENTANYL CITRATE 25 MCG: 50 INJECTION, SOLUTION INTRAMUSCULAR; INTRAVENOUS at 12:07

## 2021-09-16 RX ADMIN — MIDAZOLAM HYDROCHLORIDE 2 MG: 1 INJECTION, SOLUTION INTRAMUSCULAR; INTRAVENOUS at 12:00

## 2021-09-16 RX ADMIN — PROPOFOL INJECTABLE EMULSION 50 MG: 10 INJECTION, EMULSION INTRAVENOUS at 12:17

## 2021-09-16 RX ADMIN — SODIUM CHLORIDE, POTASSIUM CHLORIDE, SODIUM LACTATE AND CALCIUM CHLORIDE: 600; 310; 30; 20 INJECTION, SOLUTION INTRAVENOUS at 12:38

## 2021-09-16 RX ADMIN — PROPOFOL INJECTABLE EMULSION 30 MG: 10 INJECTION, EMULSION INTRAVENOUS at 12:24

## 2021-09-16 ASSESSMENT — PULMONARY FUNCTION TESTS
PIF_VALUE: 0
PIF_VALUE: 1
PIF_VALUE: 0
PIF_VALUE: 1
PIF_VALUE: 0
PIF_VALUE: 1
PIF_VALUE: 0

## 2021-09-16 ASSESSMENT — PAIN - FUNCTIONAL ASSESSMENT: PAIN_FUNCTIONAL_ASSESSMENT: 0-10

## 2021-09-16 ASSESSMENT — PAIN SCALES - GENERAL: PAINLEVEL_OUTOF10: 0

## 2021-09-16 NOTE — PROGRESS NOTES
CLINICAL PHARMACY NOTE: MEDS TO BEDS    Total # of Prescriptions Filled: 2   The following medications were delivered to the patient:  · Percocet 5-325  · Cephalexin 500mg    Additional Documentation:

## 2021-09-16 NOTE — H&P
children: Not on file    Years of education: Not on file    Highest education level: Not on file   Occupational History    Not on file   Tobacco Use    Smoking status: Never Smoker    Smokeless tobacco: Never Used   Substance and Sexual Activity    Alcohol use: Yes    Drug use: No    Sexual activity: Not on file   Other Topics Concern    Not on file   Social History Narrative    Not on file      Social Determinants of Health          Financial Resource Strain:     Difficulty of Paying Living Expenses:    Food Insecurity:     Worried About Running Out of Food in the Last Year:     [address] in the Last Year:    Transportation Needs:     Lack of Transportation (Medical):  Lack of Transportation (Non-Medical):    Physical Activity:     Days of Exercise per Week:     Minutes of Exercise per Session:    Stress:     Feeling of Stress :    Social Connections:     Frequency of Communication with Friends and Family:     Frequency of Social Gatherings with Friends and Family:     Attends Mosque Services:     Active Member of Clubs or Organizations:     Attends Club or Organization Meetings:     Marital Status:    Intimate Partner Violence:     Fear of Current or Ex-Partner:     Emotionally Abused:     Physically Abused:     Sexually Abused:          Family History         Family History   Problem Relation Age of Onset    Diabetes Paternal Grandmother           Latent    Heart Attack Maternal Grandmother      High Blood Pressure Mother           Review of systems is otherwise negative. Ht 5' 1\" (1.549 m)   Wt 160 lb (72.6 kg)   BMI 30.23 kg/m²         Objective:   Physical Exam  Vitals and nursing note reviewed. Exam conducted with a chaperone present. Constitutional:       Appearance: Normal appearance. She is well-developed. She is not diaphoretic. HENT:      Head: Normocephalic and atraumatic.       Nose: Nose normal.   Eyes:      Extraocular Movements: Extraocular movements intact. Conjunctiva/sclera: Conjunctivae normal.      Pupils: Pupils are equal, round, and reactive to light. Neck:      Vascular: No JVD. Trachea: No tracheal deviation. Cardiovascular:      Rate and Rhythm: Normal rate. Pulmonary:      Effort: Pulmonary effort is normal. No respiratory distress. Breath sounds: No wheezing. Abdominal:      General: There is no distension. Palpations: Abdomen is soft. Musculoskeletal:         General: Normal range of motion. Left hand: Deformity and tenderness present. Normal capillary refill. Normal pulse. Hands:       Cervical back: Normal range of motion. Lymphadenopathy:      Cervical: No cervical adenopathy. Skin:     General: Skin is warm and dry. Coloration: Skin is not pale. Findings: No erythema or rash. Nails: There is no clubbing. Neurological:      Mental Status: She is alert and oriented to person, place, and time. Cranial Nerves: No cranial nerve deficit. Psychiatric:         Mood and Affect: Mood normal.         Speech: Speech normal.         Behavior: Behavior normal.         Thought Content: Thought content normal.         Judgment: Judgment normal.            Assessment:        Diagnosis Orders   1. Mucoid cyst of joint      2. Ganglion, joint                       Plan:      Risks of procedure reviewed with patient. Consent form signed in office. Scheduled for surgery.                      The patient was evaluated and examined with my nurse in the room at all times. Portions of this note were transcribed using Dragon voice recognition technology and as such may reflect some variations in voice recognition.     COVID-19 precautions were taken throughout the entire office visit. Patient was screened for COVID-19 symptoms and temperature was taken prior to coming back to the exam room.    A mask as well as gloves was worn throughout the entire office visit and distancing maintained as much as possible in between the physical examination periods.     ONEL Kim MD

## 2021-09-16 NOTE — ANESTHESIA PRE PROCEDURE
Department of Anesthesiology  Preprocedure Note       Name:  Ron Turner   Age:  49 y.o.  :  1970                                          MRN:  [id number]         Date:  2021      Surgeon: Kandi Franco): Mckenna Pittman MD    Procedure: Procedure(s):  LEFT INDEX FINGER MUCOUS CYST BIOPSY EXCISION WITH LOCAL FLAP CLOSURE    Medications prior to admission:   Prior to Admission medications    Medication Sig Start Date End Date Taking? Authorizing Provider   Black Cohosh 20 MG TABS Take by mouth daily   Yes Historical Provider, MD   meloxicam (MOBIC) 15 MG tablet Take 1 tablet by mouth daily as needed for Pain 6/3/21  Yes Tash Helms DO   venlafaxine (EFFEXOR XR) 75 MG extended release capsule Take 1 capsule by mouth daily 21  Yes JULY Hurley CNP       Current medications:    Current Facility-Administered Medications   Medication Dose Route Frequency Provider Last Rate Last Admin    lactated ringers infusion   IntraVENous Continuous Cindy Grady  mL/hr at 21 1141 New Bag at 21 1141    sodium chloride flush 0.9 % injection 10 mL  10 mL IntraVENous 2 times per day Cindy Grady MD        sodium chloride flush 0.9 % injection 10 mL  10 mL IntraVENous PRN Cindy Grady MD        0.9 % sodium chloride infusion  25 mL IntraVENous PRN Cindy Grady MD        lidocaine PF 1 % injection 1 mL  1 mL IntraDERmal Once PRN Cindy Grady MD           Allergies:     Allergies   Allergen Reactions    Betadine [Povidone Iodine]     Vicodin [Hydrocodone-Acetaminophen]     Adhesive Tape Rash       Problem List:    Patient Active Problem List   Diagnosis Code    Hypertrophy of breast N62    Bilateral mastodynia N64.4    Hot flashes R23.2    Hyperlipidemia E78.5    Joint swelling M25.40    Lateral epicondylitis of left elbow M77.12    Myopia of both eyes with astigmatism and presbyopia H52.13, H52.203, H52.4    Insomnia G47.00    Depression F32.9    BMI 30.0-30.9,adult Z68.30       Past Medical History:        Diagnosis Date    Hyperlipidemia     PONV (postoperative nausea and vomiting)     Seasonal allergies        Past Surgical History:        Procedure Laterality Date    APPENDECTOMY      DILATION AND CURETTAGE OF UTERUS  1993    DILATION AND CURETTAGE OF UTERUS  1997    HYSTEROSCOPY      KNEE ARTHROSCOPY Right 03/26/90    TUBAL LIGATION  2001    WISDOM TOOTH EXTRACTION         Social History:    Social History     Tobacco Use    Smoking status: Never Smoker    Smokeless tobacco: Never Used   Substance Use Topics    Alcohol use: Yes     Comment: social                                Counseling given: Not Answered      Vital Signs (Current):   Vitals:    09/07/21 1417 09/16/21 1120   BP:  122/82   Pulse:  71   Resp:  18   Temp:  97.8 °F (36.6 °C)   TempSrc:  Infrared   SpO2:  98%   Weight: 165 lb (74.8 kg) 165 lb 8 oz (75.1 kg)   Height: 5' 1\" (1.549 m)                                               BP Readings from Last 3 Encounters:   09/16/21 122/82   08/13/21 116/66   04/22/21 122/86       NPO Status: Time of last liquid consumption: 0000                        Time of last solid consumption: 0000                        Date of last liquid consumption: 09/15/21                        Date of last solid food consumption: 09/15/21    BMI:   Wt Readings from Last 3 Encounters:   09/16/21 165 lb 8 oz (75.1 kg)   08/13/21 163 lb (73.9 kg)   07/26/21 160 lb (72.6 kg)     Body mass index is 31.27 kg/m².     CBC:   Lab Results   Component Value Date    WBC 6.1 02/08/2021    RBC 4.37 02/08/2021    HGB 13.7 02/08/2021    HCT 41.4 02/08/2021    MCV 94.7 02/08/2021    RDW 12.4 02/08/2021     02/08/2021       CMP:   Lab Results   Component Value Date     02/08/2021    K 4.4 02/08/2021     02/08/2021    CO2 26 02/08/2021    BUN 17 02/08/2021    CREATININE 0.82 02/08/2021    GFRAA >60 02/08/2021    LABGLOM >60 02/08/2021 GLUCOSE 83 02/08/2021    PROT 6.9 02/08/2021    CALCIUM 9.2 02/08/2021    BILITOT 0.16 02/08/2021    ALKPHOS 70 02/08/2021    AST 20 02/08/2021    ALT 16 02/08/2021       POC Tests: No results for input(s): POCGLU, POCNA, POCK, POCCL, POCBUN, POCHEMO, POCHCT in the last 72 hours. Coags: No results found for: PROTIME, INR, APTT    HCG (If Applicable): No results found for: PREGTESTUR, PREGSERUM, HCG, HCGQUANT     ABGs: No results found for: PHART, PO2ART, RYE1MRU, FEC1FEN, BEART, M4OSKDJF     Type & Screen (If Applicable):  No results found for: LABABO, LABRH    Drug/Infectious Status (If Applicable):  No results found for: HIV, HEPCAB    COVID-19 Screening (If Applicable): No results found for: COVID19        Anesthesia Evaluation  Patient summary reviewed and Nursing notes reviewed   history of anesthetic complications: PONV. Airway: Mallampati: I  TM distance: >3 FB   Neck ROM: full  Mouth opening: > = 3 FB Dental: normal exam         Pulmonary:Negative Pulmonary ROS and normal exam                               Cardiovascular:Negative CV ROS                      Neuro/Psych:                ROS comment: -WEAKNESS RIGHT LEG GI/Hepatic/Renal: Neg GI/Hepatic/Renal ROS            Endo/Other: Negative Endo/Other ROS                     ROS comment: -NPO AFTER MIDNIGHT  -ALLERGIES - BETADINE, VICODIN  -STEROID SHOT 2 DAYS AGO Abdominal:             Vascular: negative vascular ROS. Other Findings:             Anesthesia Plan      MAC     ASA 2       Induction: intravenous. MIPS: Postoperative opioids intended and Prophylactic antiemetics administered. Anesthetic plan and risks discussed with patient. Plan discussed with CRNA.     Attending anesthesiologist reviewed and agrees with Leander Klinefelter, MD   9/16/2021

## 2021-09-16 NOTE — ANESTHESIA POSTPROCEDURE EVALUATION
Department of Anesthesiology  Postprocedure Note    Patient: Justin Daniels  MRN: [id number]  YOB: 1970  Date of evaluation: 9/16/2021  Time:  1:47 PM     Procedure Summary     Date: 09/16/21 Room / Location: [facility] / [address] [place] [address]    Anesthesia Start: 1200 Anesthesia Stop: 1244    Procedure: EXCISION MUCOUS CYST LEFT INDEX FINGER WITH LOCAL FLAP CLOSURE (Left Fingers) Diagnosis: (M67.40   MUCOUS CYST LEFT INDEX FINGER)    Surgeons: Moon Vides MD Responsible Provider: Isabel Almaraz MD    Anesthesia Type: MAC ASA Status: 2          Anesthesia Type: MAC    Varun Phase I: Varun Score: 10    Varun Phase II: Varun Score: 10    Last vitals: Reviewed and per EMR flowsheets.        Anesthesia Post Evaluation    Patient location during evaluation: PACU  Patient participation: complete - patient participated  Level of consciousness: awake and alert  Airway patency: patent  Nausea & Vomiting: no nausea and no vomiting  Complications: no  Cardiovascular status: hemodynamically stable  Respiratory status: nasal cannula and spontaneous ventilation  Hydration status: euvolemic

## 2021-09-16 NOTE — OP NOTE
Operative Note      Patient: Seb Loving  YOB: 1970  MRN: [id number]    Date of Procedure: 9/16/2021    Pre-Op Diagnosis: M67.40   MUCOUS CYST LEFT INDEX FINGER    Post-Op Diagnosis: Same       Procedure(s):  EXCISION MUCOUS CYST LEFT INDEX FINGER WITH LOCAL FLAP CLOSURE  Total excised diameter measuring 0.5 x 0.8 cm closed with a 1 x 1.5 cm rotational flap closure  Surgeon(s): Arely Gloria MD    Assistant:   * No surgical staff found *    Anesthesia: Monitor Anesthesia Care    Estimated Blood Loss (mL): Minimal    Complications: None    Specimens:   ID Type Source Tests Collected by Time Destination   A : MUCOUS CYST LEFT INDEX FINGER Tissue Tissue SURGICAL PATHOLOGY A Aaron Williamson MD 9/16/2021 1224        Implants:  * No implants in log *      Drains: * No LDAs found *    Findings: Mucous cyst extending to the DIP joint left index finger    Detailed Description of Procedure: The patient was brought to the operating room and placed under MAC anesthesia. The left arm was prepped and draped in sterile fashion. 0.25% Marcaine plain was injected into the base of the left index finger. The cyst was identified over the DIP joint. There was significant thinning of the overlying skin. A finger tourniquet was applied. The cyst was excised with #15 blade and traced down to the DIP joint with a pair of tenotomy scissors. The cyst was removed in its entirety and the base cauterized to reduce the chance of recurrence. The defect was too large to close primarily due to the thin skin overlying the DIP joint. A rotational flap was diagrammed with a surgical marker and incised with a #15 blade. The flap was elevated and rotated to close the defect after being trimmed to fit. It was sutured with 4-0 chromic suture. Tourniquet was released. The patient tolerated the procedure well, bulky dressings were applied and she was taken to postop recovery in stable condition.     Electronically signed by Mynor Posada MD on 9/16/2021 at 12:51 PM

## 2021-09-17 LAB — SURGICAL PATHOLOGY REPORT: NORMAL

## 2021-09-23 ENCOUNTER — OFFICE VISIT (OUTPATIENT)
Dept: FAMILY MEDICINE CLINIC | Age: 51
End: 2021-09-23
Payer: COMMERCIAL

## 2021-09-23 VITALS
SYSTOLIC BLOOD PRESSURE: 112 MMHG | OXYGEN SATURATION: 99 % | TEMPERATURE: 98.4 F | BODY MASS INDEX: 31.37 KG/M2 | HEART RATE: 78 BPM | WEIGHT: 166 LBS | RESPIRATION RATE: 16 BRPM | DIASTOLIC BLOOD PRESSURE: 74 MMHG

## 2021-09-23 DIAGNOSIS — F32.A DEPRESSION, UNSPECIFIED DEPRESSION TYPE: ICD-10-CM

## 2021-09-23 DIAGNOSIS — M25.551 GREATER TROCHANTERIC PAIN SYNDROME OF RIGHT LOWER EXTREMITY: Primary | ICD-10-CM

## 2021-09-23 DIAGNOSIS — Z00.00 PREVENTATIVE HEALTH CARE: ICD-10-CM

## 2021-09-23 DIAGNOSIS — E78.5 HYPERLIPIDEMIA, UNSPECIFIED HYPERLIPIDEMIA TYPE: ICD-10-CM

## 2021-09-23 DIAGNOSIS — G25.81 RLS (RESTLESS LEGS SYNDROME): Primary | ICD-10-CM

## 2021-09-23 DIAGNOSIS — Z12.11 SCREEN FOR COLON CANCER: ICD-10-CM

## 2021-09-23 PROCEDURE — 99214 OFFICE O/P EST MOD 30 MIN: CPT | Performed by: NURSE PRACTITIONER

## 2021-09-23 RX ORDER — MELOXICAM 15 MG/1
15 TABLET ORAL DAILY PRN
Qty: 90 TABLET | Refills: 2 | Status: SHIPPED | OUTPATIENT
Start: 2021-09-23 | End: 2021-10-21 | Stop reason: SDUPTHER

## 2021-09-23 RX ORDER — ROPINIROLE 0.25 MG/1
0.25 TABLET, FILM COATED ORAL NIGHTLY
Qty: 90 TABLET | Refills: 1 | Status: SHIPPED | OUTPATIENT
Start: 2021-09-23 | End: 2022-03-14 | Stop reason: SDUPTHER

## 2021-09-23 RX ORDER — VENLAFAXINE HYDROCHLORIDE 75 MG/1
75 CAPSULE, EXTENDED RELEASE ORAL DAILY
Qty: 30 CAPSULE | Refills: 3 | Status: SHIPPED | OUTPATIENT
Start: 2021-09-23 | End: 2021-11-10 | Stop reason: SDUPTHER

## 2021-09-23 ASSESSMENT — ENCOUNTER SYMPTOMS
CONSTIPATION: 0
DIARRHEA: 0
NAUSEA: 0
COUGH: 0
ABDOMINAL PAIN: 0
CHEST TIGHTNESS: 0
SHORTNESS OF BREATH: 0
COLOR CHANGE: 0
SORE THROAT: 0
RHINORRHEA: 0
ABDOMINAL DISTENTION: 0
BACK PAIN: 0

## 2021-09-23 NOTE — PROGRESS NOTES
Abram Cochran, APRN-CNP    [facility]  [id number]  [place] , [address]  [address] [address]  Dept: [phone]  Dept Fax: [phone]     Patient ID: Claudia Jones is a 49 y.o. female Established patient    HPI    Pt here today for f/u,go over labs and/or diagnostic studies, and medication refills. Pt denies any fever or chills. Pt today denies any HA, chest pain, or SOB. Pt denies any N/V/D/C or abdominal pain. - she will be going to [employer] to work starting next week. - she would like to get back on the amaysim to help with weight loss. - did have surgery on finger and doing well. - son has covid and was sick but is recovering at this time    Otherwise pt doing well on current tx and no other concerns today. The patient's past medical, surgical, social, and family history as well as his current medications and allergies were reviewed as documented in today's encounter MADINA Cardenas. Previous office notes, labs, imaging and hospital records were reviewed prior to and during encounter. Current Outpatient Medications on File Prior to Visit   Medication Sig Dispense Refill    cephALEXin (KEFLEX) 500 MG capsule Take 1 capsule by mouth 3 times daily for 7 days 21 capsule 0    Black Cohosh 20 MG TABS Take by mouth daily      meloxicam (MOBIC) 15 MG tablet Take 1 tablet by mouth daily as needed for Pain 90 tablet 2    venlafaxine (EFFEXOR XR) 75 MG extended release capsule Take 1 capsule by mouth daily 30 capsule 3     No current facility-administered medications on file prior to visit. Subjective:     Review of Systems   Constitutional: Negative for activity change, fatigue and fever. HENT: Negative for congestion, ear pain, rhinorrhea and sore throat. Respiratory: Negative for cough, chest tightness and shortness of breath. Cardiovascular: Negative for chest pain and palpitations.    Gastrointestinal: Negative for abdominal distention, abdominal pain, constipation, diarrhea and nausea. Endocrine: Negative for polydipsia, polyphagia and polyuria. Genitourinary: Negative for difficulty urinating and dysuria. Musculoskeletal: Positive for myalgias (hip has been doing good with being off work for a week). Negative for arthralgias and back pain. Skin: Negative for color change and rash. Neurological: Negative for dizziness, weakness, light-headedness and headaches. Hematological: Negative for adenopathy. Psychiatric/Behavioral: Positive for dysphoric mood (stable). Negative for agitation and behavioral problems. The patient is not nervous/anxious. Vitals:    09/23/21 0802   BP: 112/74   Pulse: 78   Resp: 16   Temp: 98.4 °F (36.9 °C)   SpO2: 99%     Objective:     Physical Exam  Vitals reviewed. Constitutional:       General: She is not in acute distress. Appearance: Normal appearance. HENT:      Head: Normocephalic and atraumatic. Right Ear: External ear normal.      Left Ear: External ear normal.      Nose: Nose normal.      Mouth/Throat:      Mouth: Mucous membranes are moist.      Pharynx: No oropharyngeal exudate or posterior oropharyngeal erythema. Eyes:      Extraocular Movements: Extraocular movements intact. Conjunctiva/sclera: Conjunctivae normal.      Pupils: Pupils are equal, round, and reactive to light. Cardiovascular:      Rate and Rhythm: Normal rate and regular rhythm. Pulses: Normal pulses. Heart sounds: Normal heart sounds. No murmur heard. Pulmonary:      Effort: Pulmonary effort is normal. No respiratory distress. Breath sounds: Normal breath sounds. No wheezing or rales. Abdominal:      General: Bowel sounds are normal. There is no distension. Palpations: Abdomen is soft. Tenderness: There is no abdominal tenderness. Musculoskeletal:         General: Normal range of motion. Cervical back: Normal range of motion.       Right lower leg: No edema. Left lower leg: No edema. Lymphadenopathy:      Cervical: No cervical adenopathy. Skin:     General: Skin is warm and dry. Neurological:      General: No focal deficit present. Mental Status: She is alert and oriented to person, place, and time. Deep Tendon Reflexes: Reflexes normal.   Psychiatric:         Mood and Affect: Mood is depressed (stable, in great mood today. excited for new beginnings with career). Behavior: Behavior normal. Behavior is cooperative. Assessment:      Diagnosis Orders   1. Greater trochanteric pain syndrome of right lower extremity  meloxicam (MOBIC) 15 MG tablet   2. Screen for colon cancer  [facility]   3. Hyperlipidemia, unspecified hyperlipidemia type  Lipid Panel   4. Hot flashes     5. Preventative health care  CBC    Comprehensive Metabolic Panel    Hemoglobin A1C    TSH with Reflex   6. Depression, unspecified depression type  venlafaxine (EFFEXOR XR) 75 MG extended release capsule   7. BMI 31.0-31.9,adult       Plan:     Screen for colon cancer  - [facility]    Hyperlipidemia, unspecified hyperlipidemia type  - Stable:con't current tx plan  - Decrease fats, sugars, carbohydrates, and increase routine exercise  - Foods that helps increase HDL include the following: Fish, nuts, flax, avocados, and legumes (such as soy, kidney beans, chickpeas). Depression, unspecified depression type  - Stable: Medication re-filled as needed, con't medications as prescribed, con't current tx plan  - continue Effexor as prescribed. Greater trochanteric pain syndrome of right lower extremity  - Stable: Medication re-filled as needed, con't medications as prescribed, con't current tx plan  - continue mobic as prescribed. BMI 31.0-31.9,adult  - would like to get back on adipex, her 6 month holiday will be up in October.   - discussed calling in October if she is still interested and will order.     - pt verbalized understanding plan of care. Medications, labs, diagnostic studies, consultations and follow-up as documented in this encounter. Rest of systems unchanged, continue current treatments    On this date 9/23/2021 I have spent 30 minutes reviewing previous notes, test results and face to face with the patient discussing the diagnosis and importance of compliance with the treatment plan as well as documenting on the day of the visit.     Dianne Hawkins, APRN-CNP

## 2021-10-23 PROBLEM — Z00.00 PREVENTATIVE HEALTH CARE: Status: RESOLVED | Noted: 2021-09-23 | Resolved: 2021-10-23

## 2021-11-24 ENCOUNTER — OFFICE VISIT (OUTPATIENT)
Dept: FAMILY MEDICINE CLINIC | Age: 51
End: 2021-11-24
Payer: COMMERCIAL

## 2021-11-24 VITALS
BODY MASS INDEX: 29.66 KG/M2 | OXYGEN SATURATION: 98 % | HEART RATE: 89 BPM | RESPIRATION RATE: 16 BRPM | WEIGHT: 157 LBS | SYSTOLIC BLOOD PRESSURE: 116 MMHG | DIASTOLIC BLOOD PRESSURE: 70 MMHG | TEMPERATURE: 96.4 F

## 2021-11-24 PROCEDURE — 99213 OFFICE O/P EST LOW 20 MIN: CPT | Performed by: NURSE PRACTITIONER

## 2021-11-24 RX ORDER — CEPHALEXIN 500 MG/1
CAPSULE ORAL
COMMUNITY
Start: 2021-11-23 | End: 2021-12-30

## 2021-11-24 RX ORDER — PHENTERMINE HYDROCHLORIDE 37.5 MG/1
37.5 CAPSULE ORAL EVERY MORNING
Qty: 30 CAPSULE | Refills: 0 | Status: SHIPPED | OUTPATIENT
Start: 2021-11-24 | End: 2021-12-24

## 2021-11-24 ASSESSMENT — ENCOUNTER SYMPTOMS
RHINORRHEA: 0
ABDOMINAL DISTENTION: 0
BACK PAIN: 0
CONSTIPATION: 0
COLOR CHANGE: 0
SHORTNESS OF BREATH: 0
NAUSEA: 0
ABDOMINAL PAIN: 0
SORE THROAT: 0
DIARRHEA: 0
CHEST TIGHTNESS: 0
COUGH: 0

## 2021-11-24 NOTE — PROGRESS NOTES
Moises Zuluaga, APRN-CNP  [facility]  [phone]  [place] , [address]  [address] [address]  Dept: [phone]  Dept Fax: [phone]     Patient ID: Cornel Reveles is a 49 y.o. female. HPI    Pt here today for BP and weight check while being on Adipex. A refill of the medication is needed today. Pt denies any fever or chills. Pt today denies any HA, chest pain, or SOB. Pt denies any N/V/D/C or abdominal pain. This is month 1 since starting. Since her last visit, she has lost 9 lbs for a cumulative total of 9 lbs. Otherwise patient is doing well on current tx and voices no other concerns today. The patient's past medical, surgical, social, and family history as well as his current medications and allergies were reviewed as documented in today's encounter by MADIAN Mccoy. My previous office notes, labs and diagnostic studies were reviewed prior to and during encounter. Reviewed prior notes Previous PCP  Reviewed previous Labs, Imaging and Hospital Records    Current Outpatient Medications on File Prior to Visit   Medication Sig Dispense Refill    amoxicillin-clavulanate (AUGMENTIN) 875-125 MG per tablet Take 1 tablet by mouth 2 times daily for 10 days 20 tablet 0    venlafaxine (EFFEXOR XR) 75 MG extended release capsule Take 1 capsule by mouth daily 30 capsule 3    meloxicam (MOBIC) 15 MG tablet Take 1 tablet by mouth daily as needed for Pain 90 tablet 2    rOPINIRole (REQUIP) 0.25 MG tablet Take 1 tablet by mouth nightly 90 tablet 1    Black Cohosh 20 MG TABS Take by mouth daily       No current facility-administered medications on file prior to visit. Subjective:     Review of Systems   Constitutional: Negative for activity change, fatigue and fever. HENT: Negative for congestion, ear pain, rhinorrhea and sore throat. Respiratory: Negative for cough, chest tightness and shortness of breath.     Cardiovascular: Negative for chest pain and palpitations. Gastrointestinal: Negative for abdominal distention, abdominal pain, constipation, diarrhea and nausea. Endocrine: Negative for polydipsia, polyphagia and polyuria. Genitourinary: Negative for difficulty urinating and dysuria. Musculoskeletal: Negative for arthralgias, back pain and myalgias. Skin: Negative for color change and rash. Neurological: Negative for dizziness, weakness, light-headedness and headaches. Hematological: Negative for adenopathy. Psychiatric/Behavioral: Negative for agitation and behavioral problems. The patient is not nervous/anxious. Vitals:    11/24/21 1424   BP: 116/70   Pulse: 89   Resp: 16   Temp: 96.4 °F (35.8 °C)   SpO2: 98%       Objective:     Physical Exam  Vitals reviewed. Constitutional:       General: She is not in acute distress. Appearance: Normal appearance. HENT:      Head: Normocephalic and atraumatic. Right Ear: External ear normal.      Left Ear: External ear normal.      Nose: Nose normal.      Mouth/Throat:      Mouth: Mucous membranes are moist.      Pharynx: No oropharyngeal exudate or posterior oropharyngeal erythema. Eyes:      Extraocular Movements: Extraocular movements intact. Conjunctiva/sclera: Conjunctivae normal.      Pupils: Pupils are equal, round, and reactive to light. Cardiovascular:      Rate and Rhythm: Normal rate and regular rhythm. Pulses: Normal pulses. Heart sounds: Normal heart sounds. No murmur heard. Pulmonary:      Effort: Pulmonary effort is normal. No respiratory distress. Breath sounds: Normal breath sounds. No wheezing or rales. Abdominal:      General: Bowel sounds are normal. There is no distension. Palpations: Abdomen is soft. Tenderness: There is no abdominal tenderness. Musculoskeletal:         General: Normal range of motion. Cervical back: Normal range of motion. Right lower leg: No edema.       Left lower leg: No edema. Lymphadenopathy:      Cervical: No cervical adenopathy. Skin:     General: Skin is warm and dry. Neurological:      General: No focal deficit present. Mental Status: She is alert and oriented to person, place, and time. Deep Tendon Reflexes: Reflexes normal.   Psychiatric:         Mood and Affect: Mood normal.         Behavior: Behavior normal. Behavior is cooperative. Assessment:      Diagnosis Orders   1. BMI 31.0-31.9,adult       Plan:     -Stable: Medication re-filled as needed, con't medications as prescribed, con't current tx plan  - Continue Adipex as previously prescribed  - I also did stress the importance of exercise to aim for 150 minutes of aerobic activity a week and to watch her diet and calorie intake. - Will continue with monthly BP and weight checks    - Rest of systems unchanged, continue current treatments. On this date 11/24/2021 I have spent 20 minutes reviewing previous notes, test results and face to face with the patient discussing the diagnosis and importance of compliance with the treatment plan as well as documenting on the day of the visit.      Jose Antonio MCDOWELL-CNP

## 2021-12-08 ENCOUNTER — OFFICE VISIT (OUTPATIENT)
Dept: GASTROENTEROLOGY | Age: 51
End: 2021-12-08
Payer: COMMERCIAL

## 2021-12-08 VITALS — BODY MASS INDEX: 29.29 KG/M2 | WEIGHT: 155 LBS

## 2021-12-08 DIAGNOSIS — K21.9 GASTROESOPHAGEAL REFLUX DISEASE, UNSPECIFIED WHETHER ESOPHAGITIS PRESENT: ICD-10-CM

## 2021-12-08 DIAGNOSIS — Z12.11 COLON CANCER SCREENING: Primary | ICD-10-CM

## 2021-12-08 PROCEDURE — 99204 OFFICE O/P NEW MOD 45 MIN: CPT | Performed by: INTERNAL MEDICINE

## 2021-12-08 RX ORDER — POLYETHYLENE GLYCOL 3350 17 G/17G
POWDER, FOR SOLUTION ORAL
Qty: 255 G | Refills: 0 | Status: SHIPPED | OUTPATIENT
Start: 2021-12-08 | End: 2021-12-30

## 2021-12-08 RX ORDER — ONDANSETRON 4 MG/1
4 TABLET, ORALLY DISINTEGRATING ORAL EVERY 8 HOURS PRN
Qty: 15 TABLET | Refills: 0 | Status: SHIPPED | OUTPATIENT
Start: 2021-12-08 | End: 2021-12-30

## 2021-12-08 RX ORDER — BISACODYL 5 MG
TABLET, DELAYED RELEASE (ENTERIC COATED) ORAL
Qty: 4 TABLET | Refills: 0 | Status: SHIPPED | OUTPATIENT
Start: 2021-12-08 | End: 2021-12-30

## 2021-12-08 ASSESSMENT — ENCOUNTER SYMPTOMS
GASTROINTESTINAL NEGATIVE: 1
RESPIRATORY NEGATIVE: 1
ALLERGIC/IMMUNOLOGIC NEGATIVE: 1
EYES NEGATIVE: 1

## 2021-12-08 NOTE — PROGRESS NOTES
Reason for Referral:  GERD      Chrissy Kline, APRN - CNP  [address] [facility],  [facility]    Chief Complaint   Patient presents with    New Patient     referred for colon screen - fam hx colon cancer    Gastroesophageal Reflux     Patient takes nexium PRN. States symptoms come and go, nothing consistent. HISTORY OF PRESENT ILLNESS: Breann Johnson is a 49 y.o. female with a past history remarkable for HL, GERD, referred for evaluation for screening colonoscopy. Smoker: None   Drinking history: Socially  Illicit drugs: None   Abdominal surgeries: tubal ligation ~2016, 2001, Appendectomy 1991  Prior Colonoscopy: None   Prior EGD: none  FH of GI issues:GF- Colectomy. Father- Diverticulitis. Second cousin-- CRC, stage IV. Past Medical,Family, and Social History reviewed and does contribute to the patient presentingcondition. Patient's PMH/PSH,SH,PSYCH Hx, MEDs, ALLERGIES, and ROS were all reviewed and updated in the appropriate sections. PAST MEDICAL HISTORY:  Past Medical History:   Diagnosis Date    Hyperlipidemia     PONV (postoperative nausea and vomiting)     Seasonal allergies        Past Surgical History:   Procedure Laterality Date    APPENDECTOMY      DILATION AND CURETTAGE OF UTERUS  1993    DILATION AND CURETTAGE OF UTERUS  1997    HAND SURGERY Left 9/16/2021    EXCISION MUCOUS CYST LEFT INDEX FINGER WITH LOCAL FLAP CLOSURE performed by Julia Patricio MD at [place] [address] ARTHROSCOPY Right 03/26/90    TUBAL LIGATION  2001    WISDOM TOOTH EXTRACTION         CURRENT MEDICATIONS:    Current Outpatient Medications:     cephALEXin (KEFLEX) 500 MG capsule, TAKE 1 CAPSULE BY MOUTH TWO TIMES A DAY UNTIL GONE, Disp: , Rfl:     phentermine (ADIPEX-P) 37.5 MG capsule, Take 1 capsule by mouth every morning for 30 days. , Disp: 30 capsule, Rfl: 0    venlafaxine (EFFEXOR XR) 75 MG extended release capsule, Take 1 capsule by mouth daily, Disp: 30 capsule, Rfl: 3    meloxicam (MOBIC) 15 MG tablet, Take 1 tablet by mouth daily as needed for Pain, Disp: 90 tablet, Rfl: 2    rOPINIRole (REQUIP) 0.25 MG tablet, Take 1 tablet by mouth nightly, Disp: 90 tablet, Rfl: 1    Black Cohosh 20 MG TABS, Take by mouth daily, Disp: , Rfl:     ALLERGIES:   Allergies   Allergen Reactions    Betadine [Povidone Iodine]     Vicodin [Hydrocodone-Acetaminophen]     Adhesive Tape Rash       FAMILY HISTORY:       Problem Relation Age of Onset    Diabetes Paternal Grandmother         Latent    Heart Attack Maternal Grandmother     High Blood Pressure Mother     Colon Cancer Paternal Grandfather          SOCIAL HISTORY:   Social History     Socioeconomic History    Marital status:      Spouse name: Not on file    Number of children: Not on file    Years of education: Not on file    Highest education level: Not on file   Occupational History    Not on file   Tobacco Use    Smoking status: Never Smoker    Smokeless tobacco: Never Used   Vaping Use    Vaping Use: Never used   Substance and Sexual Activity    Alcohol use: Yes     Comment: social    Drug use: No    Sexual activity: Not on file   Other Topics Concern    Not on file   Social History Narrative    Not on file     Social Determinants of Health     Financial Resource Strain: Low Risk     Difficulty of Paying Living Expenses: Not hard at all   Food Insecurity: No Food Insecurity    Worried About Running Out of Food in the Last Year: Never true    [place] of Food in the Last Year: Never true   Transportation Needs:     Lack of Transportation (Medical): Not on file    Lack of Transportation (Non-Medical):  Not on file   Physical Activity:     Days of Exercise per Week: Not on file    Minutes of Exercise per Session: Not on file   Stress:     Feeling of Stress : Not on file   Social Connections:     Frequency of Communication with Friends and Family: Not on file    Frequency of Social Gatherings with Friends and Family: Not on file    Attends Holiness Services: Not on file    Active Member of Clubs or Organizations: Not on file    Attends Club or Organization Meetings: Not on file    Marital Status: Not on file   Intimate Partner Violence:     Fear of Current or Ex-Partner: Not on file    Emotionally Abused: Not on file    Physically Abused: Not on file    Sexually Abused: Not on file   Housing Stability:     Unable to Pay for Housing in the Last Year: Not on file    Number of [place] in the Last Year: Not on file    Unstable Housing in the Last Year: Not on file         REVIEW OF SYSTEMS: A 12-point review of systems was obtained and pertinent positives and negatives were listed below. REVIEW OF SYSTEMS:     Constitutional: No fever, no chills, no lethargy, no weakness. HEENT:  No headache, otalgia, itchy eyes, nasal discharge or sore throat. Cardiac:  No chest pain, dyspnea, orthopnea or PND. Chest:   No cough, phlegm or wheezing. Abdomen:      Detailed by MA   Neuro:  No focal weakness, abnormal movements or seizure like activity. Skin:   No rashes, no itching. :   No hematuria, no pyuria, no dysuria, no flank pain. Extremities:  No swelling or joint pains. ROS was otherwise negative    Review of Systems   Constitutional: Negative. HENT: Negative. Eyes: Negative. Respiratory: Negative. Cardiovascular: Negative. Gastrointestinal: Negative. Endocrine: Negative. Genitourinary: Negative. Musculoskeletal: Negative. Skin: Negative. Allergic/Immunologic: Negative. Neurological: Negative. Hematological: Negative. Psychiatric/Behavioral: Negative. All other systems reviewed and are negative. PHYSICAL EXAMINATION: Vital signs reviewed per the nursing documentation. Wt 155 lb (70.3 kg)   BMI 29.29 kg/m²   Body mass index is 29.29 kg/m².    Physical Exam    Physical Exam Constitutional: Patient is oriented to person, place, and time. Patient appears well-developed and well-nourished. HENT:   Head: Normocephalic and atraumatic. Eyes: Pupils are equal, round, and reactive to light. EOM are normal.   Neck: Normal range of motion. Neck supple. No JVD present. No tracheal deviation present. No thyromegaly present. Cardiovascular: Normal rate, regular rhythm, normal heart sounds and intact distal pulses. Pulmonary/Chest: Effort normal and breath sounds normal. No stridor. No respiratory distress. He has no wheezes. He has no rales. He exhibits no tenderness. Abdominal: Soft. Bowel sounds are normal. He exhibits no distension and no mass. There is no tenderness. There is no rebound and no guarding. No hernia. Musculoskeletal: Normal range of motion. Lymphadenopathy:    Patient has no cervical adenopathy. Neurological: Patient is alert and oriented to person, place, and time. Psychiatric: Patient has a normal mood and affect. Patient behavior is normal.       LABORATORY DATA: Reviewed  Lab Results   Component Value Date    WBC 6.1 02/08/2021    HGB 13.7 02/08/2021    HCT 41.4 02/08/2021    MCV 94.7 02/08/2021     02/08/2021     02/08/2021    K 4.4 02/08/2021     02/08/2021    CO2 26 02/08/2021    BUN 17 02/08/2021    CREATININE 0.82 02/08/2021    LABALBU 4.3 02/08/2021    BILITOT 0.16 (L) 02/08/2021    ALKPHOS 70 02/08/2021    AST 20 02/08/2021    ALT 16 02/08/2021         Lab Results   Component Value Date    RBC 4.37 02/08/2021    HGB 13.7 02/08/2021    MCV 94.7 02/08/2021    MCH 31.4 02/08/2021    MCHC 33.1 02/08/2021    RDW 12.4 02/08/2021    MPV 10.4 02/08/2021    BASOPCT 1 02/08/2021    LYMPHSABS 2.26 02/08/2021    MONOSABS 0.46 02/08/2021    NEUTROABS 3.25 02/08/2021    EOSABS 0.06 02/08/2021    BASOSABS 0.06 02/08/2021         DIAGNOSTIC TESTING:     No results found.        IMPRESSION: Anrde Diallo is a 49 y.o. female with a past history remarkable for HL, GERD, referred for evaluation for screening colonoscopy. Patient reports family history of colorectal cancer in his second and third-degree relatives. Assessment  1. Colon cancer screening    2. Gastroesophageal reflux disease, unspecified whether esophagitis present        PLAN:    1) colorectal cancer screeningplan for screening colonoscopy, risk benefits and alternative discussed with the patient. Further recommendations will after the procedure. No high risk features clinically identified, abdominal exam appears to be normal.  No rectal bleeding, no change in bowel habits. No change in appetite. 2) Intermittent GERD--- diet controlled. Otherwise, clinically doing well from GI standpoint. Thank you for allowing me to participate in the care of Ms. Alexandra. For any further questions please do not hesitate to contact me. I have reviewed and agree with the MA/LPN ROS please refer to their documentation from today's encounter on a separate note. Leanne Hancock MD, MPH   [facility]  Office #: [phone]          this note is created with the assistance of a speech recognition program.  While intending to generate a document that actually reflects the content of the visit, the document can still have some errors including those of syntax and sound a like substitutions which may escape proof reading. It such instances, actual meaning can be extrapolated by contextual diversion.

## 2021-12-14 ENCOUNTER — ANESTHESIA EVENT (OUTPATIENT)
Dept: OPERATING ROOM | Age: 51
End: 2021-12-14
Payer: COMMERCIAL

## 2021-12-16 ENCOUNTER — HOSPITAL ENCOUNTER (OUTPATIENT)
Age: 51
Setting detail: OUTPATIENT SURGERY
Discharge: HOME | End: 2021-12-16
Attending: INTERNAL MEDICINE | Admitting: INTERNAL MEDICINE
Payer: COMMERCIAL

## 2021-12-16 ENCOUNTER — ANESTHESIA (OUTPATIENT)
Dept: OPERATING ROOM | Age: 51
End: 2021-12-16
Payer: COMMERCIAL

## 2021-12-16 VITALS
TEMPERATURE: 96.8 F | HEIGHT: 61 IN | OXYGEN SATURATION: 100 % | SYSTOLIC BLOOD PRESSURE: 121 MMHG | HEART RATE: 66 BPM | RESPIRATION RATE: 18 BRPM | WEIGHT: 155 LBS | BODY MASS INDEX: 29.27 KG/M2 | DIASTOLIC BLOOD PRESSURE: 76 MMHG

## 2021-12-16 VITALS — SYSTOLIC BLOOD PRESSURE: 99 MMHG | OXYGEN SATURATION: 100 % | DIASTOLIC BLOOD PRESSURE: 54 MMHG

## 2021-12-16 PROBLEM — Z12.11 ENCOUNTER FOR SCREENING COLONOSCOPY: Status: ACTIVE | Noted: 2021-09-23

## 2021-12-16 LAB — HCG, PREGNANCY URINE (POC): NEGATIVE

## 2021-12-16 PROCEDURE — 3700000000 HC ANESTHESIA ATTENDED CARE: Performed by: INTERNAL MEDICINE

## 2021-12-16 PROCEDURE — 3609027000 HC COLONOSCOPY: Performed by: INTERNAL MEDICINE

## 2021-12-16 PROCEDURE — 2709999900 HC NON-CHARGEABLE SUPPLY: Performed by: INTERNAL MEDICINE

## 2021-12-16 PROCEDURE — 3700000001 HC ADD 15 MINUTES (ANESTHESIA): Performed by: INTERNAL MEDICINE

## 2021-12-16 PROCEDURE — 7100000011 HC PHASE II RECOVERY - ADDTL 15 MIN: Performed by: INTERNAL MEDICINE

## 2021-12-16 PROCEDURE — 2580000003 HC RX 258: Performed by: NURSE ANESTHETIST, CERTIFIED REGISTERED

## 2021-12-16 PROCEDURE — 2500000003 HC RX 250 WO HCPCS: Performed by: NURSE ANESTHETIST, CERTIFIED REGISTERED

## 2021-12-16 PROCEDURE — 81025 URINE PREGNANCY TEST: CPT

## 2021-12-16 PROCEDURE — 7100000010 HC PHASE II RECOVERY - FIRST 15 MIN: Performed by: INTERNAL MEDICINE

## 2021-12-16 PROCEDURE — 6360000002 HC RX W HCPCS: Performed by: NURSE ANESTHETIST, CERTIFIED REGISTERED

## 2021-12-16 PROCEDURE — 45378 DIAGNOSTIC COLONOSCOPY: CPT | Performed by: INTERNAL MEDICINE

## 2021-12-16 RX ORDER — FENTANYL CITRATE 50 UG/ML
25 INJECTION, SOLUTION INTRAMUSCULAR; INTRAVENOUS EVERY 5 MIN PRN
Status: DISCONTINUED | OUTPATIENT
Start: 2021-12-16 | End: 2021-12-16 | Stop reason: HOSPADM

## 2021-12-16 RX ORDER — SODIUM CHLORIDE, SODIUM LACTATE, POTASSIUM CHLORIDE, CALCIUM CHLORIDE 600; 310; 30; 20 MG/100ML; MG/100ML; MG/100ML; MG/100ML
INJECTION, SOLUTION INTRAVENOUS CONTINUOUS PRN
Status: DISCONTINUED | OUTPATIENT
Start: 2021-12-16 | End: 2021-12-16 | Stop reason: SDUPTHER

## 2021-12-16 RX ORDER — MIDAZOLAM HYDROCHLORIDE 1 MG/ML
INJECTION INTRAMUSCULAR; INTRAVENOUS PRN
Status: DISCONTINUED | OUTPATIENT
Start: 2021-12-16 | End: 2021-12-16 | Stop reason: SDUPTHER

## 2021-12-16 RX ORDER — LIDOCAINE HYDROCHLORIDE 10 MG/ML
INJECTION, SOLUTION EPIDURAL; INFILTRATION; INTRACAUDAL; PERINEURAL PRN
Status: DISCONTINUED | OUTPATIENT
Start: 2021-12-16 | End: 2021-12-16 | Stop reason: SDUPTHER

## 2021-12-16 RX ORDER — ONDANSETRON 2 MG/ML
4 INJECTION INTRAMUSCULAR; INTRAVENOUS
Status: DISCONTINUED | OUTPATIENT
Start: 2021-12-16 | End: 2021-12-16 | Stop reason: HOSPADM

## 2021-12-16 RX ORDER — SODIUM CHLORIDE 9 MG/ML
25 INJECTION, SOLUTION INTRAVENOUS PRN
Status: DISCONTINUED | OUTPATIENT
Start: 2021-12-16 | End: 2021-12-16 | Stop reason: HOSPADM

## 2021-12-16 RX ORDER — PROPOFOL 10 MG/ML
INJECTION, EMULSION INTRAVENOUS PRN
Status: DISCONTINUED | OUTPATIENT
Start: 2021-12-16 | End: 2021-12-16 | Stop reason: SDUPTHER

## 2021-12-16 RX ORDER — HYDRALAZINE HYDROCHLORIDE 20 MG/ML
5 INJECTION INTRAMUSCULAR; INTRAVENOUS EVERY 10 MIN PRN
Status: DISCONTINUED | OUTPATIENT
Start: 2021-12-16 | End: 2021-12-16 | Stop reason: HOSPADM

## 2021-12-16 RX ORDER — SODIUM CHLORIDE 9 MG/ML
INJECTION, SOLUTION INTRAVENOUS CONTINUOUS
Status: DISCONTINUED | OUTPATIENT
Start: 2021-12-16 | End: 2021-12-16 | Stop reason: HOSPADM

## 2021-12-16 RX ORDER — SODIUM CHLORIDE 0.9 % (FLUSH) 0.9 %
10 SYRINGE (ML) INJECTION EVERY 12 HOURS SCHEDULED
Status: DISCONTINUED | OUTPATIENT
Start: 2021-12-16 | End: 2021-12-16 | Stop reason: HOSPADM

## 2021-12-16 RX ORDER — SODIUM CHLORIDE, SODIUM LACTATE, POTASSIUM CHLORIDE, CALCIUM CHLORIDE 600; 310; 30; 20 MG/100ML; MG/100ML; MG/100ML; MG/100ML
INJECTION, SOLUTION INTRAVENOUS CONTINUOUS
Status: DISCONTINUED | OUTPATIENT
Start: 2021-12-16 | End: 2021-12-16 | Stop reason: HOSPADM

## 2021-12-16 RX ORDER — DIPHENHYDRAMINE HYDROCHLORIDE 50 MG/ML
12.5 INJECTION INTRAMUSCULAR; INTRAVENOUS
Status: DISCONTINUED | OUTPATIENT
Start: 2021-12-16 | End: 2021-12-16 | Stop reason: HOSPADM

## 2021-12-16 RX ORDER — MEPERIDINE HYDROCHLORIDE 50 MG/ML
12.5 INJECTION INTRAMUSCULAR; INTRAVENOUS; SUBCUTANEOUS EVERY 5 MIN PRN
Status: DISCONTINUED | OUTPATIENT
Start: 2021-12-16 | End: 2021-12-16 | Stop reason: HOSPADM

## 2021-12-16 RX ORDER — SODIUM CHLORIDE 0.9 % (FLUSH) 0.9 %
10 SYRINGE (ML) INJECTION PRN
Status: DISCONTINUED | OUTPATIENT
Start: 2021-12-16 | End: 2021-12-16 | Stop reason: HOSPADM

## 2021-12-16 RX ORDER — PROMETHAZINE HYDROCHLORIDE 25 MG/ML
6.25 INJECTION, SOLUTION INTRAMUSCULAR; INTRAVENOUS
Status: DISCONTINUED | OUTPATIENT
Start: 2021-12-16 | End: 2021-12-16 | Stop reason: HOSPADM

## 2021-12-16 RX ADMIN — PROPOFOL 30 MG: 10 INJECTION, EMULSION INTRAVENOUS at 10:56

## 2021-12-16 RX ADMIN — PROPOFOL 30 MG: 10 INJECTION, EMULSION INTRAVENOUS at 11:05

## 2021-12-16 RX ADMIN — SODIUM CHLORIDE, POTASSIUM CHLORIDE, SODIUM LACTATE AND CALCIUM CHLORIDE: 600; 310; 30; 20 INJECTION, SOLUTION INTRAVENOUS at 10:50

## 2021-12-16 RX ADMIN — LIDOCAINE HYDROCHLORIDE 50 MG: 10 INJECTION, SOLUTION EPIDURAL; INFILTRATION; INTRACAUDAL at 10:52

## 2021-12-16 RX ADMIN — PROPOFOL 30 MG: 10 INJECTION, EMULSION INTRAVENOUS at 11:00

## 2021-12-16 RX ADMIN — MIDAZOLAM HYDROCHLORIDE 2 MG: 1 INJECTION, SOLUTION INTRAMUSCULAR; INTRAVENOUS at 10:50

## 2021-12-16 RX ADMIN — PROPOFOL 30 MG: 10 INJECTION, EMULSION INTRAVENOUS at 11:10

## 2021-12-16 RX ADMIN — PROPOFOL 100 MG: 10 INJECTION, EMULSION INTRAVENOUS at 10:54

## 2021-12-16 RX ADMIN — PROPOFOL 30 MG: 10 INJECTION, EMULSION INTRAVENOUS at 10:58

## 2021-12-16 ASSESSMENT — PULMONARY FUNCTION TESTS
PIF_VALUE: 1

## 2021-12-16 ASSESSMENT — PAIN SCALES - GENERAL: PAINLEVEL_OUTOF10: 0

## 2021-12-16 NOTE — OP NOTE
Operative Note      Patient: Alysia Ordoñez  YOB: 1970  MRN: [id number]    Date of Procedure: 12/16/2021    Pre-Op Diagnosis: Z12.11 SCREENING    Post-Op Diagnosis: FAIR to ADEQUATE PREP, mild diverticulosis, small internal/external hemorrhoids       Procedure(s):  COLORECTAL CANCER SCREENING, NOT HIGH RISK    Surgeon(s):  Juliano Mejía MD    Assistant:   * No surgical staff found *    Anesthesia: Monitor Anesthesia Care    Estimated Blood Loss (mL): Minimal    Complications: None    Specimens:   * No specimens in log *    Implants:  * No implants in log *      Drains: * No LDAs found *          [facility]     [facility]     COLONOSCOPY    PROCEDURE DATE: 12/16/21    REFERRING PHYSICIAN: No ref. provider found     PRIMARY CARE PROVIDER: JULY Xie - [facility]    ATTENDING PHYSICIAN: Juliano Mejía MD     HISTORY: Ms. Alysia Ordoñez is a 49 y.o. female who presents to the  endoscopy unit for colonoscopy. The patient's clinical history is remarkable for HL, mild obesity, referred for screening colonoscopy. She is currently medically stable and appropriate for the planned procedure. PREOPERATIVE DIAGNOSIS: screening for colon cancer. PROCEDURES:   Transanal Colonoscopy --screening. POSTPROCEDURE DIAGNOSIS:    FAIR to ADEQUATE PREP    1) Mild left sided diverticulosis, small mouthed in the sigmoid colon. No large lesions or polyps identified. 2) Small internal/external hemorrhoids. MEDICATIONS:     MAC per anesthesia     EBL <10cc      INSTRUMENT: Olympus CF-H180 AL Pediatric flexible Colonoscope. PREPARATION: The nature and character of the procedure as well as risks, benefits, and alternatives were discussed with the patient and informed consent was obtained.  Complications were said to include, but were not limited to: medication allergy, medication reaction, cardiovascular and respiratory problems, bleeding, perforation, infection, and/or missed diagnosis. Following arrival in the endoscopy room, the patient was placed in the left lateral decubitus position and final time-out accomplished in the presence of the nursing staff. Baseline vital signs were obtained and reviewed, and IV sedation was subsequently initiated. FINDINGS: Rectal examination demonstrated no significant visible external abnormality and digital palpation was unremarkable. Following adequate conscious sedation the colonoscope was introduced and advanced under direct visualization to the cecum, which was identified by the ileocecal valve and appendiceal orifice. The bowel preparation was felt to be FAIR to ADEQUATE. This included moderate amounts of green stool that was mostly able to be adequately irrigated and aspirated. Cecal intubation time was 8 minutes. Once maximally inserted, the endoscope was withdrawn and the mucosa was carefully inspected. The mucosal exam was revealed mild diverticulosis. Retroflexion was performed in the rectum and small hemorrhoids seen. Withdrawal time was 17 minutes. IMPRESSION:     FAIR to ADEQUATE PREP    1) Mild left sided diverticulosis, small mouthed in the sigmoid colon. No large lesions or polyps identified. 2) Small internal/external hemorrhoids. RECOMMENDATIONS:   1) Follow up with referring provider, as previously scheduled. 2) Repeat Colonoscopy in 7-10 yrs. Increase dietary fiber. Brian Dooley MD  [facility]   12/16/21    This note is created with the assistance of a speech recognition program.  While intending to generate a document that actually reflects the content of the visit, the document can still have some errors including those of syntax and sound a like substitutions which may escape proof reading. It such instances, actual meaning can be extrapolated by contextual diversion.     The patient was counseled at length about the risks of miguelina Covid-19 during their perioperative period and any recovery window from their procedure. The patient was made aware that miguelina Covid-19  may worsen their prognosis for recovering from their procedure  and lend to a higher morbidity and/or mortality risk. All material risks, benefits, and reasonable alternatives including postponing the procedure were discussed. The patient DOES wish to proceed with the procedure at this time.     Electronically signed by Latoya Kelsey MD on 12/16/2021 at 11:21 AM

## 2021-12-16 NOTE — H&P
Procedure History and Physical    Pre-Procedural Diagnosis:  Screening colonoscopy    Indications:  same    Procedure Planned: colonoscopy     History Obtained From:  patient    HISTORY OF PRESENT ILLNESS:       The patient is a 49 y.o. female who presents for the above procedure.         Past Medical History:    Past Medical History:   Diagnosis Date    Hyperlipidemia     PONV (postoperative nausea and vomiting)     Seasonal allergies        Past Surgical History:    Past Surgical History:   Procedure Laterality Date    APPENDECTOMY      DILATION AND CURETTAGE OF UTERUS  1993    DILATION AND CURETTAGE OF UTERUS  1997    HAND SURGERY Left 9/16/2021    EXCISION MUCOUS CYST LEFT INDEX FINGER WITH LOCAL FLAP CLOSURE performed by Lennon Galeazzi, MD at [place] [address] ARTHROSCOPY Right 03/26/90    TUBAL LIGATION  2001    WISDOM TOOTH EXTRACTION         Medications:  Current Facility-Administered Medications   Medication Dose Route Frequency Provider Last Rate Last Admin    0.9 % sodium chloride infusion   IntraVENous Continuous Janalee Hamman, MD        lactated ringers infusion   IntraVENous Continuous Janalee Hamman, MD        sodium chloride flush 0.9 % injection 10 mL  10 mL IntraVENous 2 times per day Janalee Hamman, MD        sodium chloride flush 0.9 % injection 10 mL  10 mL IntraVENous PRN Janalee Hamman, MD        0.9 % sodium chloride infusion  25 mL IntraVENous PRN Janalee Hamman, MD        meperidine (DEMEROL) injection 12.5 mg  12.5 mg IntraVENous Q5 Min PRN Janalee Hamman, MD        fentaNYL (SUBLIMAZE) injection 25 mcg  25 mcg IntraVENous Q5 Min PRN Janalee Hamman, MD        [facility]etron [facility]) injection 4 mg  4 mg IntraVENous Once PRN Janalee Hamman, MD        promethazine (PHENERGAN) injection 6.25 mg  6.25 mg IntraMUSCular Once PRN Janalee Hamman, MD        diphenhydrAMINE (BENADRYL) injection 12.5 mg  12.5 mg IntraVENous Once PRN Janalee Hamman, MD        hydrALAZINE (APRESOLINE) injection 5 mg  5 mg IntraVENous Q10 Min PRN Art Montano MD           Allergies: Allergies   Allergen Reactions    Betadine [Povidone Iodine]     Vicodin [Hydrocodone-Acetaminophen]     Adhesive Tape Rash                 Social   Social History     Tobacco Use    Smoking status: Never Smoker    Smokeless tobacco: Never Used   Substance Use Topics    Alcohol use: Yes     Comment: social        PSYCH HISTORY:  Depression No  Anxiety No  Suicide No       Family History   Problem Relation Age of Onset    Diabetes Paternal Grandmother         Latent    Heart Attack Maternal Grandmother     High Blood Pressure Mother     Colon Cancer Paternal Grandfather       No family history of colon cancer, Crohn's disease, or ulcerative colitis    Problems with Sedation/Anesthesia in the past? no    REVIEW OF SYSTEMS:  12 point review of systems negative other than mentioned above. PHYSICAL EXAM:    Vitals:  /72   Pulse 67   Temp 97 °F (36.1 °C)   Resp 16   Ht 5' 1\" (1.549 m)   Wt 155 lb (70.3 kg)   SpO2 100%   BMI 29.29 kg/m²     Focused Exam related to procedure:    General appearance: NAD, conversant   Eyes: anicteric sclerae, moist conjunctivae; no lid-lag; PERRLA   Lungs: CTA, with normal respiratory effort and no intercostal retractions   CV: RRR, no MRGs   Abdomen: Soft, non-tender; no masses or HSM   Skin: Normal temperature, turgor and texture; no rash, ulcers or subcutaneous nodules     DATA:  CBC:   Lab Results   Component Value Date    WBC 6.1 02/08/2021    HGB 13.7 02/08/2021    HCT 41.4 02/08/2021    MCV 94.7 02/08/2021     02/08/2021     BUN/Cr:   Lab Results   Component Value Date    BUN 17 02/08/2021   ,   Lab Results   Component Value Date    CREATININE 0.82 02/08/2021     Potassium:   Lab Results   Component Value Date    K 4.4 02/08/2021     PT/INR: No results found for: INR, PROTIME    ASSESSMENT AND PLAN:       1. Patient is a 49 y.o. female with above specified procedure planned.   Expected Sedation/Anesthesia Type: MAC    2. ASA ([address] Anesthesiology) Anesthesia Status: Class 2 - A normal healthy patient with mild systemic disease    3. Mallampati: II (soft palate, uvula, fauces visible)  4. Procedure options, risks and benefits reviewed with Patient. Patient expresses understanding.     5.  Consent has been signed:  Yes    Cynthia Sepulveda MD

## 2021-12-16 NOTE — ANESTHESIA POSTPROCEDURE EVALUATION
POST- ANESTHESIA EVALUATION       Pt Name: Shawna Lefort  MRN: [id number]  [place]: 1970  Date of evaluation: 12/16/2021  Time:  12:38 PM      /76   Pulse 66   Temp 96.8 °F (36 °C) (Temporal)   Resp 18   Ht 5' 1\" (1.549 m)   Wt 155 lb (70.3 kg)   SpO2 100%   BMI 29.29 kg/m²      Consciousness Level  Awake  Cardiopulmonary Status  Stable  Pain Adequately Treated YES  Nausea / Vomiting  NO  Adequate Hydration  YES  Anesthesia Related Complications NONE      Electronically signed by Zurdo Stuart MD on 12/16/2021 at 12:38 PM       Department of Anesthesiology  Postprocedure Note    Patient: Shawna Lefort  MRN: [id number]  YOB: 1970  Date of evaluation: 12/16/2021  Time:  12:38 PM     Procedure Summary     Date: 12/16/21 Room / Location: [address] [place] [address] / [address] [place] [address]    Anesthesia Start: 8192 Anesthesia Stop: 3926    Procedure: COLORECTAL CANCER SCREENING, NOT HIGH RISK (N/A ) Diagnosis: (Z12.11 SCREENING)    Surgeons: Homer Perez MD Responsible Provider: Zurdo Stuart MD    Anesthesia Type: MAC ASA Status: 2          Anesthesia Type: MAC    Varun Phase I: Varun Score: 10    Varun Phase II: Varun Score: 9    Last vitals: Reviewed and per EMR flowsheets.        Anesthesia Post Evaluation

## 2021-12-30 ENCOUNTER — OFFICE VISIT (OUTPATIENT)
Dept: FAMILY MEDICINE CLINIC | Age: 51
End: 2021-12-30
Payer: COMMERCIAL

## 2021-12-30 VITALS
OXYGEN SATURATION: 96 % | WEIGHT: 155 LBS | RESPIRATION RATE: 16 BRPM | SYSTOLIC BLOOD PRESSURE: 112 MMHG | BODY MASS INDEX: 29.29 KG/M2 | HEART RATE: 92 BPM | DIASTOLIC BLOOD PRESSURE: 71 MMHG

## 2021-12-30 DIAGNOSIS — J01.90 ACUTE BACTERIAL SINUSITIS: ICD-10-CM

## 2021-12-30 DIAGNOSIS — M25.551 GREATER TROCHANTERIC PAIN SYNDROME OF RIGHT LOWER EXTREMITY: ICD-10-CM

## 2021-12-30 DIAGNOSIS — B96.89 ACUTE BACTERIAL SINUSITIS: ICD-10-CM

## 2021-12-30 DIAGNOSIS — F32.A DEPRESSION, UNSPECIFIED DEPRESSION TYPE: ICD-10-CM

## 2021-12-30 DIAGNOSIS — R05.9 COUGH: Primary | ICD-10-CM

## 2021-12-30 PROCEDURE — 99214 OFFICE O/P EST MOD 30 MIN: CPT | Performed by: NURSE PRACTITIONER

## 2021-12-30 RX ORDER — PHENTERMINE HYDROCHLORIDE 37.5 MG/1
37.5 CAPSULE ORAL EVERY MORNING
Qty: 30 CAPSULE | Refills: 0 | Status: SHIPPED | OUTPATIENT
Start: 2021-12-30 | End: 2022-01-29

## 2021-12-30 RX ORDER — VENLAFAXINE HYDROCHLORIDE 75 MG/1
75 CAPSULE, EXTENDED RELEASE ORAL DAILY
Qty: 30 CAPSULE | Refills: 3 | Status: SHIPPED | OUTPATIENT
Start: 2021-12-30 | End: 2022-03-14

## 2021-12-30 RX ORDER — MELOXICAM 15 MG/1
15 TABLET ORAL DAILY PRN
Qty: 90 TABLET | Refills: 2 | Status: SHIPPED | OUTPATIENT
Start: 2021-12-30 | End: 2022-03-14 | Stop reason: SDUPTHER

## 2021-12-30 RX ORDER — DEXAMETHASONE 4 MG/1
4 TABLET ORAL
Qty: 7 TABLET | Refills: 0 | Status: SHIPPED | OUTPATIENT
Start: 2021-12-30 | End: 2022-01-06

## 2021-12-30 RX ORDER — AZITHROMYCIN 250 MG/1
250 TABLET, FILM COATED ORAL SEE ADMIN INSTRUCTIONS
Qty: 6 TABLET | Refills: 0 | Status: SHIPPED | OUTPATIENT
Start: 2021-12-30 | End: 2022-01-04

## 2021-12-30 RX ORDER — PHENTERMINE HYDROCHLORIDE 37.5 MG/1
37.5 CAPSULE ORAL EVERY MORNING
Qty: 30 CAPSULE | Refills: 0 | Status: CANCELLED | OUTPATIENT
Start: 2021-12-30 | End: 2022-01-29

## 2021-12-30 ASSESSMENT — ENCOUNTER SYMPTOMS
ABDOMINAL PAIN: 0
CONSTIPATION: 0
SHORTNESS OF BREATH: 0
BACK PAIN: 0
SINUS PAIN: 1
NAUSEA: 0
RHINORRHEA: 1
VOICE CHANGE: 1
DIARRHEA: 0
SINUS PRESSURE: 1
COUGH: 0
CHEST TIGHTNESS: 0
ABDOMINAL DISTENTION: 0
COLOR CHANGE: 0
SORE THROAT: 0

## 2021-12-30 NOTE — PROGRESS NOTES
Ele Jolly, APRN-[address]  [address] [address], [address]  [address] [address]  Dept: [phone]  Dept Fax: [phone]     Patient ID: Giselle Montanez is a 49 y.o. female. HPI    Pt here today for BP and weight check while being on Adipex. A refill of the medication is needed today. This is month 2 since starting. Since her last visit, she has lost 0 lbs for a cumulative total of 9 lbs. She is traveling for work so has had trouble getting in for monthly appointments. - she does complain of nasal congestion, voice hoarse, fatigue, body aches for a few days, her boyfriend recently tested positive for covid, she took at home Covid test and it was negative. Pt states she has no energy and gets exhausted easily when doing chores around the house. Otherwise patient is doing well on current tx and voices no other concerns today. The patient's past medical, surgical, social, and family history as well as his current medications and allergies were reviewed as documented in today's encounter by MADINA Erickson. My previous office notes, labs and diagnostic studies were reviewed prior to and during encounter.      Reviewed previous Labs, Imaging and Hospital Records    Current Outpatient Medications on File Prior to Visit   Medication Sig Dispense Refill    ondansetron (ZOFRAN ODT) 4 MG disintegrating tablet Take 1 tablet by mouth every 8 hours as needed for Nausea or Vomiting 15 tablet 0    polyethylene glycol (GLYCOLAX) 17 GM/SCOOP powder Use as directed following your patient instructions given by office 255 g 0    bisacodyl (BISACODYL) 5 MG EC tablet Take 4 tabs at 10am the day prior to Colonoscopy 4 tablet 0    cephALEXin (KEFLEX) 500 MG capsule TAKE 1 CAPSULE BY MOUTH TWO TIMES A DAY UNTIL GONE      venlafaxine (EFFEXOR XR) 75 MG extended release capsule Take 1 capsule by mouth daily 30 capsule 3    meloxicam (MOBIC) 15 MG tablet Take 1 tablet by mouth daily as needed for Pain 90 tablet 2    rOPINIRole (REQUIP) 0.25 MG tablet Take 1 tablet by mouth nightly 90 tablet 1    Black Cohosh 20 MG TABS Take by mouth daily       No current facility-administered medications on file prior to visit. Subjective:     Review of Systems   Constitutional: Positive for fatigue. Negative for activity change and fever. HENT: Positive for congestion, postnasal drip, rhinorrhea, sinus pressure, sinus pain and voice change. Negative for ear pain and sore throat. Respiratory: Negative for cough (dry hacky occasional), chest tightness and shortness of breath. Cardiovascular: Negative for chest pain and palpitations. Gastrointestinal: Negative for abdominal distention, abdominal pain, constipation, diarrhea and nausea. Endocrine: Negative for polydipsia, polyphagia and polyuria. Genitourinary: Negative for difficulty urinating and dysuria. Musculoskeletal: Positive for myalgias (body aches). Negative for arthralgias and back pain. Skin: Negative for color change and rash. Neurological: Negative for dizziness, weakness, light-headedness and headaches. Hematological: Negative for adenopathy. Psychiatric/Behavioral: Negative for agitation and behavioral problems. The patient is not nervous/anxious. Vitals:    12/30/21 1332   BP: 112/71   Pulse: 92   Resp: 16   SpO2: 96%       Objective:     Physical Exam  Vitals reviewed. Constitutional:       General: She is not in acute distress. Appearance: Normal appearance. HENT:      Head: Normocephalic and atraumatic. Right Ear: External ear normal.      Left Ear: External ear normal.      Nose:      Right Turbinates: Swollen. Left Turbinates: Swollen. Right Sinus: Maxillary sinus tenderness and frontal sinus tenderness present. Left Sinus: Maxillary sinus tenderness and frontal sinus tenderness present.       Mouth/Throat:      Mouth: Mucous membranes are tx plan  - continue mobic as prescribed. Depression, unspecified depression type  - Stable: Medication re-filled as needed, con't medications as prescribed, con't current tx plan  - Continue with effexor as previously prescribed. - Offered reassurance, allowed to ventilate feelings and ask questions; non-pharmological coping methods discussed. BMI 29.0-29.9,adult  -Stable: Medication re-filled as needed, con't medications as prescribed, con't current tx plan  - Continue adipex as previously prescribed  - I also did stress the importance of exercise to aim for 150 minutes of aerobic activity a week and to watch her diet and calorie intake. - Will continue with monthly BP and weight checks    Cough   Acute bacterial sinusitis  Will treat with decadron and azithromycin and flonase 1 spray each nostril nightly. - Continue social distancing and good hand washing.   - Tylenol/ibuprofen as needed  - OTC symptom relief  - Call office if any change or worsening in symptoms    Return in about 1 month (around 1/30/2022) for weight management, BP and weight check. - Rest of systems unchanged, continue current treatments. On this date 12/30/2021 I have spent 30 minutes reviewing previous notes, test results and face to face with the patient discussing the diagnosis and importance of compliance with the treatment plan as well as documenting on the day of the visit.      Ivania MCDOWELL-CNP

## 2022-01-15 PROBLEM — Z12.11 ENCOUNTER FOR SCREENING COLONOSCOPY: Status: RESOLVED | Noted: 2021-09-23 | Resolved: 2022-01-15

## 2022-03-14 ENCOUNTER — OFFICE VISIT (OUTPATIENT)
Dept: FAMILY MEDICINE CLINIC | Age: 52
End: 2022-03-14
Payer: COMMERCIAL

## 2022-03-14 VITALS
BODY MASS INDEX: 30.23 KG/M2 | SYSTOLIC BLOOD PRESSURE: 126 MMHG | HEART RATE: 79 BPM | RESPIRATION RATE: 16 BRPM | DIASTOLIC BLOOD PRESSURE: 82 MMHG | TEMPERATURE: 97.9 F | WEIGHT: 160 LBS | OXYGEN SATURATION: 99 %

## 2022-03-14 DIAGNOSIS — Z00.00 ANNUAL PHYSICAL EXAM: Primary | ICD-10-CM

## 2022-03-14 DIAGNOSIS — G25.81 RLS (RESTLESS LEGS SYNDROME): ICD-10-CM

## 2022-03-14 DIAGNOSIS — G47.00 INSOMNIA, UNSPECIFIED TYPE: ICD-10-CM

## 2022-03-14 DIAGNOSIS — M25.551 GREATER TROCHANTERIC PAIN SYNDROME OF RIGHT LOWER EXTREMITY: ICD-10-CM

## 2022-03-14 DIAGNOSIS — R45.86 MOOD SWINGS: ICD-10-CM

## 2022-03-14 DIAGNOSIS — F41.9 ANXIETY: ICD-10-CM

## 2022-03-14 DIAGNOSIS — Z12.31 ENCOUNTER FOR SCREENING MAMMOGRAM FOR MALIGNANT NEOPLASM OF BREAST: ICD-10-CM

## 2022-03-14 DIAGNOSIS — Z23 NEED FOR PROPHYLACTIC VACCINATION AND INOCULATION AGAINST VARICELLA: ICD-10-CM

## 2022-03-14 DIAGNOSIS — E78.5 HYPERLIPIDEMIA, UNSPECIFIED HYPERLIPIDEMIA TYPE: ICD-10-CM

## 2022-03-14 PROBLEM — M77.12 LATERAL EPICONDYLITIS OF LEFT ELBOW: Status: RESOLVED | Noted: 2017-05-09 | Resolved: 2022-03-14

## 2022-03-14 PROCEDURE — 99396 PREV VISIT EST AGE 40-64: CPT | Performed by: NURSE PRACTITIONER

## 2022-03-14 RX ORDER — ESCITALOPRAM OXALATE 10 MG/1
10 TABLET ORAL DAILY
Qty: 30 TABLET | Refills: 0 | Status: SHIPPED | OUTPATIENT
Start: 2022-03-14

## 2022-03-14 RX ORDER — HYDROXYZINE HYDROCHLORIDE 25 MG/1
TABLET, FILM COATED ORAL
Qty: 30 TABLET | Refills: 1 | Status: SHIPPED | OUTPATIENT
Start: 2022-03-14

## 2022-03-14 RX ORDER — ROPINIROLE 0.25 MG/1
0.25 TABLET, FILM COATED ORAL NIGHTLY
Qty: 90 TABLET | Refills: 1 | Status: SHIPPED | OUTPATIENT
Start: 2022-03-14

## 2022-03-14 RX ORDER — MELOXICAM 15 MG/1
15 TABLET ORAL DAILY PRN
Qty: 90 TABLET | Refills: 2 | Status: SHIPPED | OUTPATIENT
Start: 2022-03-14 | End: 2022-05-06 | Stop reason: SDUPTHER

## 2022-03-14 ASSESSMENT — PATIENT HEALTH QUESTIONNAIRE - PHQ9
SUM OF ALL RESPONSES TO PHQ QUESTIONS 1-9: 6
2. FEELING DOWN, DEPRESSED OR HOPELESS: 1
6. FEELING BAD ABOUT YOURSELF - OR THAT YOU ARE A FAILURE OR HAVE LET YOURSELF OR YOUR FAMILY DOWN: 0
1. LITTLE INTEREST OR PLEASURE IN DOING THINGS: 0
7. TROUBLE CONCENTRATING ON THINGS, SUCH AS READING THE NEWSPAPER OR WATCHING TELEVISION: 0
10. IF YOU CHECKED OFF ANY PROBLEMS, HOW DIFFICULT HAVE THESE PROBLEMS MADE IT FOR YOU TO DO YOUR WORK, TAKE CARE OF THINGS AT HOME, OR GET ALONG WITH OTHER PEOPLE: 0
SUM OF ALL RESPONSES TO PHQ QUESTIONS 1-9: 6
SUM OF ALL RESPONSES TO PHQ9 QUESTIONS 1 & 2: 1
SUM OF ALL RESPONSES TO PHQ QUESTIONS 1-9: 6
3. TROUBLE FALLING OR STAYING ASLEEP: 3
9. THOUGHTS THAT YOU WOULD BE BETTER OFF DEAD, OR OF HURTING YOURSELF: 0
SUM OF ALL RESPONSES TO PHQ QUESTIONS 1-9: 6
5. POOR APPETITE OR OVEREATING: 0
8. MOVING OR SPEAKING SO SLOWLY THAT OTHER PEOPLE COULD HAVE NOTICED. OR THE OPPOSITE, BEING SO FIGETY OR RESTLESS THAT YOU HAVE BEEN MOVING AROUND A LOT MORE THAN USUAL: 0
4. FEELING TIRED OR HAVING LITTLE ENERGY: 2

## 2022-03-14 ASSESSMENT — ENCOUNTER SYMPTOMS
ABDOMINAL PAIN: 0
ABDOMINAL DISTENTION: 0
BACK PAIN: 0
SHORTNESS OF BREATH: 0
SORE THROAT: 0
CONSTIPATION: 0
NAUSEA: 0
CHEST TIGHTNESS: 0
DIARRHEA: 0
COUGH: 0
RHINORRHEA: 0
COLOR CHANGE: 0

## 2022-03-14 NOTE — PROGRESS NOTES
Dano Bone, APRN-[facility]  [address] [address], [address]  [facility] [address]  Dept: [phone]  Dept Fax: [phone]    Patient ID: Teresa Garcia is a 49 y.o. female Established patient. HPI    Presents for regular check-up and review of labs. Today, patient complains of insomnia, anxiety/ depression for the past 2 months, she feels like she is on edge and isn't sure why. 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 She  Doesn't feel like the effexor is doing anything. Preventative care, female:  Last PAP: 2/13/2020, due 2025  Last mammogram: due  Last colonoscopy: 12/2021  Flu vaccine: 9/2021  Tdap: 2018  Nicotine use: never  Alcohol use: yes socially  Drug use: no  Last dental appointment: year and a half  Last optometry appointment: glasses, November, 2021    Previous office notes, labs, imaging and hospital records were reviewed prior to and during encounter. Otherwise pt doing well on current tx and no other concerns today. The patient's past medical, surgical, social, and family history as well as his current medications and allergies were reviewed as documented in today's encounter MADINA Reddy. Current Outpatient Medications on File Prior to Visit   Medication Sig Dispense Refill    meloxicam (MOBIC) 15 MG tablet Take 1 tablet by mouth daily as needed for Pain 90 tablet 2    venlafaxine (EFFEXOR XR) 75 MG extended release capsule Take 1 capsule by mouth daily 30 capsule 3    rOPINIRole (REQUIP) 0.25 MG tablet Take 1 tablet by mouth nightly 90 tablet 1    Black Cohosh 20 MG TABS Take by mouth daily       No current facility-administered medications on file prior to visit. Subjective:     Review of Systems   Constitutional: Negative for activity change, fatigue and fever.    HENT: Negative for congestion, ear pain, rhinorrhea and sore throat. Respiratory: Negative for cough, chest tightness and shortness of breath. Cardiovascular: Negative for chest pain and palpitations. Gastrointestinal: Negative for abdominal distention, abdominal pain, constipation, diarrhea and nausea. Endocrine: Negative for polydipsia, polyphagia and polyuria. Genitourinary: Negative for difficulty urinating and dysuria. Musculoskeletal: Negative for arthralgias, back pain and myalgias. Skin: Negative for color change and rash. Neurological: Negative for dizziness, weakness, light-headedness and headaches. Hematological: Negative for adenopathy. Psychiatric/Behavioral: Positive for dysphoric mood and sleep disturbance. Negative for agitation and behavioral problems. The patient is nervous/anxious. Vitals:    03/14/22 0903   BP: 126/82   Pulse: 79   Resp: 16   Temp: 97.9 °F (36.6 °C)   SpO2: 99%     Objective:     Physical Exam  Vitals reviewed. Constitutional:       General: She is not in acute distress. Appearance: Normal appearance. HENT:      Head: Normocephalic and atraumatic. Right Ear: External ear normal.      Left Ear: External ear normal.      Nose: Nose normal.      Mouth/Throat:      Mouth: Mucous membranes are moist.      Pharynx: No oropharyngeal exudate or posterior oropharyngeal erythema. Eyes:      Extraocular Movements: Extraocular movements intact. Conjunctiva/sclera: Conjunctivae normal.      Pupils: Pupils are equal, round, and reactive to light. Cardiovascular:      Rate and Rhythm: Normal rate and regular rhythm. Pulses: Normal pulses. Heart sounds: Normal heart sounds. No murmur heard. Pulmonary:      Effort: Pulmonary effort is normal. No respiratory distress. Breath sounds: Normal breath sounds. No wheezing or rales. Abdominal:      General: Bowel sounds are normal. There is no distension. Palpations: Abdomen is soft. Tenderness:  There is no abdominal tenderness. Musculoskeletal:         General: Normal range of motion. Cervical back: Normal range of motion. Right lower leg: No edema. Left lower leg: No edema. Lymphadenopathy:      Head:      Right side of head: No submental, submandibular, tonsillar, preauricular, posterior auricular or occipital adenopathy. Left side of head: No submental, submandibular, tonsillar, preauricular, posterior auricular or occipital adenopathy. Cervical: No cervical adenopathy. Skin:     General: Skin is warm and dry. Neurological:      General: No focal deficit present. Mental Status: She is alert and oriented to person, place, and time. Deep Tendon Reflexes: Reflexes normal.   Psychiatric:         Attention and Perception: Attention and perception normal.         Mood and Affect: Mood is anxious (constant movement ) and depressed (stable). Speech: Speech normal.         Behavior: Behavior normal. Behavior is cooperative. Thought Content: Thought content normal.       Assessment:      Diagnosis Orders   1. Annual physical exam     2. Encounter for screening mammogram for malignant neoplasm of breast     3. Hyperlipidemia, unspecified hyperlipidemia type     4. Greater trochanteric pain syndrome of right lower extremity  meloxicam (MOBIC) 15 MG tablet   5. RLS (restless legs syndrome)  rOPINIRole (REQUIP) 0.25 MG tablet   6. Need for prophylactic vaccination and inoculation against varicella  zoster recombinant adjuvanted vaccine (SHINGRIX) 50 MCG/0.5ML SUSR injection   7. Mood swings     8. Anxiety     9. Insomnia, unspecified type         Plan:     Hyperlipidemia, unspecified hyperlipidemia type  - Stable:  con't current tx plan  - Lifestyle changes: Decrease fats, sugars, carbohydrates, and increase routine exercise, try to get 150 minutes of aerobic activity a week and watch calorie portions and to limit her carbs in her diet.     Greater trochanteric pain syndrome of right lower extremity  - Stable: Medication re-filled as needed, con't medications as prescribed, con't current tx plan  - continue mobic as previously prescribed. RLS (restless legs syndrome)  - Stable: Medication re-filled as needed, con't medications as prescribed, con't current tx plan  - continue requip nightly as previously prescribed. Mood swings  Anxiety  - will start her on lexapro and wean off the Effexor.   - Pt encouraged to deep breath and relax through anxious moments   - Offered reassurance and allowed to ventilate feelings and ask questions.   - Non-pharmological coping methods discussed. Insomnia, unspecified type  - will start hydroxyzine as needed at night for insomnia. - Discussed sleep hygiene, limiting caffeine after noon and decreasing naps during the day. Annual physical exam  - We did discuss the recommended preventative screening guidelines including routine dental visits and eye exam.  - Detailed education was provided on the patient's after visit summary.  - due to have labs completed and will call with results. - Will cont to follow with  Ob/gyn as instructed for routine PAP. - Annual mammograms as recommended. Return in about 1 month (around 4/14/2022) for anxiety, depression.    - pt verbalized understanding plan of care. Medications, labs, diagnostic studies, consultations and follow-up as documented in this encounter. Rest of systems unchanged, continue current treatments  On this date 3/14/2022 I have spent 30 minutes reviewing previous notes, test results and face to face with the patient discussing the diagnosis and importance of compliance with the treatment plan as well as documenting on the day of the visit.     JULY Barksdale-CNP

## 2022-03-14 NOTE — PATIENT INSTRUCTIONS
Patient Education        hydroxyzine  Pronunciation:  ji DROX ee zeen  Brand:  Vistaril  What is the most important information I should know about hydroxyzine? You should not use hydroxyzine if you are pregnant, especially during the first or second trimester. Hydroxyzine can cause a serious heart problem, especially if you use certain medicines at the same time. Tell your doctor about all your current medicines and any you start or stop using. What is hydroxyzine? Hydroxyzine reduces activity in the central nervous system. It also acts as an antihistamine that reduces the effects of natural chemical histamine in the body. Histamine can produce symptoms of itching, or hives on the skin. Hydroxyzine is used as a sedative to treat anxiety and tension. It is also used together with other medications given during and after general anesthesia. Hydroxyzine is also used to treat allergic skin reactions such as hives or contact dermatitis. Hydroxyzine may also be used for purposes not listed in this medication guide. What should I discuss with my healthcare provider before taking hydroxyzine? You should not use hydroxyzine if you are allergic to it, or if:  · you have long QT syndrome;  · you are allergic to cetirizine (Zyrtec) or levocetirizine (Xyzal); or  · you are in the first trimester of pregnancy. You should not use hydroxyzine if you are pregnant, especially during the first or second trimester. Hydroxyzine could harm the unborn baby or cause birth defects. Use effective birth control to prevent pregnancy while you are using this medicine.   To make sure hydroxyzine is safe for you, tell your doctor if you have:  · blockage in your digestive tract (stomach or intestines);  · bladder obstruction or other urination problems;  · glaucoma;  · heart disease, slow heartbeats;  · personal or family history of long QT syndrome;  · an electrolyte imbalance (such as high or low levels of potassium in your blood);  · if you have recently had a heart attack. It is not known whether hydroxyzine passes into breast milk or if it could harm a nursing baby. You should not breast-feed while using this medicine. Do not give this medicine to a child without medical advice. How should I take hydroxyzine? Follow all directions on your prescription label. Your doctor may occasionally change your dose. Do not use this medicine in larger or smaller amounts or for longer than recommended. Shake the oral suspension (liquid) well just before you measure a dose. Measure liquid medicine with the dosing syringe provided, or with a special dose-measuring spoon or medicine cup. If you do not have a dose-measuring device, ask your pharmacist for one. Hydroxyzine is for short-term use only. You should not take this medicine for longer than 4 months. Call your doctor if your anxiety symptoms do not improve, or if they get worse. Store at room temperature away from moisture and heat. What happens if I miss a dose? Take the missed dose as soon as you remember. Skip the missed dose if it is almost time for your next scheduled dose. Do not take extra medicine to make up the missed dose. What happens if I overdose? Seek emergency medical attention or call the Poison Help line at 1-939.266.9590. Overdose symptoms may include severe drowsiness, nausea, vomiting, uncontrolled muscle movements, or seizure (convulsions). What should I avoid while taking hydroxyzine? This medicine may impair your thinking or reactions. Be careful if you drive or do anything that requires you to be alert. Drinking alcohol with this medicine can cause side effects. What are the possible side effects of hydroxyzine? Get emergency medical help if you have signs of an allergic reaction:  hives; difficult breathing; swelling of your face, lips, tongue, or throat. In rare cases, hydroxyzine may cause a severe skin reaction.  Stop taking this medicine and call your doctor right away if you have sudden skin redness or a rash that spreads and causes white or yellow pustules, blistering, or peeling. Stop using hydroxyzine and call your doctor at once if you have:  · fast or pounding heartbeats;  · headache with chest pain;  · severe dizziness, fainting; or  · a seizure (convulsions). Side effects such as drowsiness and confusion may be more likely in older adults. Common side effects may include:  · drowsiness;  · headache;  · dry mouth; or  · skin rash. This is not a complete list of side effects and others may occur. Call your doctor for medical advice about side effects. You may report side effects to FDA at 7-360-NYV-8375. What other drugs will affect hydroxyzine? Taking this medicine with other drugs that make you sleepy can worsen this effect. Ask your doctor before taking hydroxyzine with a sleeping pill, narcotic pain medicine, muscle relaxer, or medicine for anxiety, depression, or seizures. Hydroxyzine can cause a serious heart problem, especially if you use certain medicines at the same time, including antibiotics, antidepressants, heart rhythm medicine, antipsychotic medicines, and medicines to treat cancer, malaria, HIV or AIDS. Tell your doctor about all medicines you use, and those you start or stop using during your treatment with hydroxyzine. Other drugs may interact with hydroxyzine, including prescription and over-the-counter medicines, vitamins, and herbal products. Not all possible interactions are listed here. Tell each of your health care providers about all medicines you use now and any medicine you start or stop using. Where can I get more information? Your pharmacist can provide more information about hydroxyzine. Remember, keep this and all other medicines out of the reach of children, never share your medicines with others, and use this medication only for the indication prescribed.    Every effort has been made to ensure that the information provided by [address] is accurate, up-to-date, and complete, but no guarantee is made to that effect. Drug information contained herein may be time sensitive. [facility] information has been compiled for use by healthcare practitioners and consumers in the [place] and therefore [facility] does not warrant that uses outside of the [place] are appropriate, unless specifically indicated otherwise. [facility]'s drug information does not endorse drugs, diagnose patients or recommend therapy. [facility]'s drug information is an informational resource designed to assist licensed healthcare practitioners in caring for their patients and/or to serve consumers viewing this service as a supplement to, and not a substitute for, the expertise, skill, knowledge and judgment of healthcare practitioners. The absence of a warning for a given drug or drug combination in no way should be construed to indicate that the drug or drug combination is safe, effective or appropriate for any given patient. [facility] does not assume any responsibility for any aspect of healthcare administered with the aid of information [facility] provides. The information contained herein is not intended to cover all possible uses, directions, precautions, warnings, drug interactions, allergic reactions, or adverse effects. If you have questions about the drugs you are taking, check with your doctor, nurse or pharmacist.  Copyright 2011-2546 [address]. Version: 8.01. Revision date: 3/15/2017. Care instructions adapted under license by [facility] ([facility]). If you have questions about a medical condition or this instruction, always ask your healthcare professional. [place] [address] any warranty or liability for your use of this information.

## 2022-04-13 PROBLEM — Z00.00 ANNUAL PHYSICAL EXAM: Status: RESOLVED | Noted: 2022-03-14 | Resolved: 2022-04-13

## 2022-04-17 ENCOUNTER — PATIENT MESSAGE (OUTPATIENT)
Dept: FAMILY MEDICINE CLINIC | Age: 52
End: 2022-04-17

## 2022-04-17 DIAGNOSIS — M25.551 GREATER TROCHANTERIC PAIN SYNDROME OF RIGHT LOWER EXTREMITY: ICD-10-CM

## 2022-05-06 RX ORDER — MELOXICAM 15 MG/1
15 TABLET ORAL DAILY PRN
Qty: 30 TABLET | Refills: 1 | Status: SHIPPED | OUTPATIENT
Start: 2022-05-06

## 2022-05-23 ENCOUNTER — OFFICE VISIT (OUTPATIENT)
Dept: GASTROENTEROLOGY | Age: 52
End: 2022-05-23
Payer: COMMERCIAL

## 2022-05-23 VITALS
WEIGHT: 162.6 LBS | SYSTOLIC BLOOD PRESSURE: 130 MMHG | BODY MASS INDEX: 30.7 KG/M2 | HEART RATE: 76 BPM | HEIGHT: 61 IN | DIASTOLIC BLOOD PRESSURE: 87 MMHG

## 2022-05-23 DIAGNOSIS — K57.90 DIVERTICULOSIS: Primary | ICD-10-CM

## 2022-05-23 PROCEDURE — 99213 OFFICE O/P EST LOW 20 MIN: CPT | Performed by: INTERNAL MEDICINE

## 2022-05-23 ASSESSMENT — ENCOUNTER SYMPTOMS
BLOOD IN STOOL: 0
ABDOMINAL PAIN: 0
RECTAL PAIN: 0
SHORTNESS OF BREATH: 0
DIARRHEA: 0
VOMITING: 0
NAUSEA: 0
CONSTIPATION: 0
TROUBLE SWALLOWING: 0
ABDOMINAL DISTENTION: 0
VOICE CHANGE: 0
ANAL BLEEDING: 0
WHEEZING: 0
CHOKING: 0
COUGH: 0

## 2022-05-23 NOTE — PROGRESS NOTES
GI FOLLOW UP    INTERVAL HISTORY:   Patient underwent a colonoscopy and was identified to have diverticulosis. Chief Complaint   Patient presents with    Follow-up    Colonoscopy       1. Diverticulosis           HISTORY OF PRESENT ILLNESS: Abby Ding is a 49 y.o. female with a past history remarkable for HL, GERD, referred for evaluation for screening colonoscopy. Colonoscopy was unremarkable. Mild left-sided diverticulosis. No polyps identified. Repeat colonoscopy in 10 years.        Smoker: None   Drinking history: Socially  Illicit drugs: None   Abdominal surgeries: tubal ligation ~2016, 2001, Appendectomy 1991  Prior Colonoscopy: None   Prior EGD: none  FH of GI issues:GF- Colectomy. Father- Diverticulitis. Second cousin-- CRC, stage IV.        Past Medical,Family, and Social History reviewed and does contribute to the patient presenting condition. Patient's PMH/PSH,SH,PSYCH Hx, MEDs, ALLERGIES, and ROS were all reviewed and updated in the appropriate sections.     PAST MEDICAL HISTORY:  Past Medical History:   Diagnosis Date    Hyperlipidemia     PONV (postoperative nausea and vomiting)     Seasonal allergies        Past Surgical History:   Procedure Laterality Date    APPENDECTOMY      COLONOSCOPY N/A 12/16/2021     COLORECTAL CANCER SCREENING, NOT HIGH RISK (N/A )    COLONOSCOPY N/A 12/16/2021    COLORECTAL CANCER SCREENING, NOT HIGH RISK performed by Rhoda Apley, MD at [address] [place] [address] HAND SURGERY Left 9/16/2021    EXCISION MUCOUS CYST LEFT INDEX FINGER WITH LOCAL FLAP CLOSURE performed by Thomas Dexter MD at [place] [address] ARTHROSCOPY Right 03/26/90    TUBAL LIGATION  2001    WISDOM TOOTH EXTRACTION         CURRENT MEDICATIONS:    Current Outpatient Medications:     meloxicam (MOBIC) 15 MG tablet, Take 1 tablet by mouth daily as needed for Pain, Disp: 30 tablet, Rfl: 1    rOPINIRole (REQUIP) 0.25 MG tablet, Take 1 tablet by mouth nightly, Disp: 90 tablet, Rfl: 1    hydrOXYzine (ATARAX) 25 MG tablet, Take 1-2 tabs as needed at night for insomnia, Disp: 30 tablet, Rfl: 1    Black Cohosh 20 MG TABS, Take by mouth daily, Disp: , Rfl:     escitalopram (LEXAPRO) 10 MG tablet, Take 1 tablet by mouth daily (Patient not taking: Reported on 5/23/2022), Disp: 30 tablet, Rfl: 0    ALLERGIES:   Allergies   Allergen Reactions    Betadine [Povidone Iodine]     Vicodin [Hydrocodone-Acetaminophen]     Adhesive Tape Rash       FAMILY HISTORY:       Problem Relation Age of Onset    Diabetes Paternal Grandmother         Latent    Heart Attack Maternal Grandmother     High Blood Pressure Mother     Colon Cancer Paternal Grandfather          SOCIAL HISTORY:   Social History     Socioeconomic History    Marital status:      Spouse name: Not on file    Number of children: Not on file    Years of education: Not on file    Highest education level: Not on file   Occupational History    Not on file   Tobacco Use    Smoking status: Never Smoker    Smokeless tobacco: Never Used   Vaping Use    Vaping Use: Never used   Substance and Sexual Activity    Alcohol use: Yes     Comment: social    Drug use: No    Sexual activity: Not on file   Other Topics Concern    Not on file   Social History Narrative    Not on file     Social Determinants of Health     Financial Resource Strain: Low Risk     Difficulty of Paying Living Expenses: Not hard at all   Food Insecurity: No Food Insecurity    Worried About Running Out of Food in the Last Year: Never true    [place] of Food in the Last Year: Never true   Transportation Needs:     Lack of Transportation (Medical): Not on file    Lack of Transportation (Non-Medical):  Not on file Physical Activity:     Days of Exercise per Week: Not on file    Minutes of Exercise per Session: Not on file   Stress:     Feeling of Stress : Not on file   Social Connections:     Frequency of Communication with Friends and Family: Not on file    Frequency of Social Gatherings with Friends and Family: Not on file    Attends Roman Catholic Services: Not on file    Active Member of [address] [place] [address] or Organizations: Not on file    Attends Club or Organization Meetings: Not on file    Marital Status: Not on file   Intimate Partner Violence:     Fear of Current or Ex-Partner: Not on file    Emotionally Abused: Not on file    Physically Abused: Not on file    Sexually Abused: Not on file   Housing Stability:     Unable to Pay for Housing in the Last Year: Not on file    Number of [place] in the Last Year: Not on file    Unstable Housing in the Last Year: Not on file       REVIEW OF SYSTEMS: A 12-point review of systems was obtained and pertinent positives and negatives were listed below. REVIEW OF SYSTEMS:     Constitutional: No fever, no chills, no lethargy, no weakness. HEENT:  No headache, otalgia, itchy eyes, nasal discharge or sore throat. Cardiac:  No chest pain, dyspnea, orthopnea or PND. Chest:   No cough, phlegm or wheezing. Abdomen:      Detailed by MA   Neuro:  No focal weakness, abnormal movements or seizure like activity. Skin:   No rashes, no itching. :   No hematuria, no pyuria, no dysuria, no flank pain. Extremities:  No swelling or joint pains. ROS was otherwise negative    Review of Systems   Constitutional: Negative for appetite change, fatigue and unexpected weight change. HENT: Negative for trouble swallowing and voice change. Eyes: Negative for visual disturbance. Respiratory: Negative for cough, choking, shortness of breath and wheezing. Cardiovascular: Negative for chest pain, palpitations and leg swelling.    Gastrointestinal: Negative for abdominal distention, abdominal pain, anal bleeding, blood in stool, constipation, diarrhea, nausea, rectal pain and vomiting. Genitourinary: Negative for difficulty urinating. Neurological: Negative for dizziness, seizures, weakness, numbness and headaches. Hematological: Does not bruise/bleed easily. Psychiatric/Behavioral: Negative for confusion and sleep disturbance. The patient is not nervous/anxious. PHYSICAL EXAMINATION: Vital signs reviewed per the nursing documentation. /87   Pulse 76   Ht 5' 1\" (1.549 m)   Wt 162 lb 9.6 oz (73.8 kg)   BMI 30.72 kg/m²   Body mass index is 30.72 kg/m². Physical Exam    Physical Exam   Constitutional: Patient is oriented to person, place, and time. Patient appears well-developed and well-nourished. HENT:   Head: Normocephalic and atraumatic. Eyes: Pupils are equal, round, and reactive to light. EOM are normal.   Neck: Normal range of motion. Neck supple. No JVD present. No tracheal deviation present. No thyromegaly present. Cardiovascular: Normal rate, regular rhythm, normal heart sounds and intact distal pulses. Pulmonary/Chest: Effort normal and breath sounds normal. No stridor. No respiratory distress. He has no wheezes. He has no rales. He exhibits no tenderness. Abdominal: Soft. Bowel sounds are normal. He exhibits no distension and no mass. There is no tenderness. There is no rebound and no guarding. No hernia. Musculoskeletal: Normal range of motion. Lymphadenopathy:    Patient has no cervical adenopathy. Neurological: Patient is alert and oriented to person, place, and time. Psychiatric: Patient has a normal mood and affect.  Patient behavior is normal.       LABORATORY DATA: Reviewed  Lab Results   Component Value Date    WBC 6.1 02/08/2021    HGB 13.7 02/08/2021    HCT 41.4 02/08/2021    MCV 94.7 02/08/2021     02/08/2021     02/08/2021    K 4.4 02/08/2021     02/08/2021    CO2 26 02/08/2021    BUN 17 02/08/2021 CREATININE 0.82 02/08/2021    LABALBU 4.3 02/08/2021    BILITOT 0.16 (L) 02/08/2021    ALKPHOS 70 02/08/2021    AST 20 02/08/2021    ALT 16 02/08/2021         Lab Results   Component Value Date    RBC 4.37 02/08/2021    HGB 13.7 02/08/2021    MCV 94.7 02/08/2021    MCH 31.4 02/08/2021    MCHC 33.1 02/08/2021    RDW 12.4 02/08/2021    MPV 10.4 02/08/2021    BASOPCT 1 02/08/2021    LYMPHSABS 2.26 02/08/2021    MONOSABS 0.46 02/08/2021    NEUTROABS 3.25 02/08/2021    EOSABS 0.06 02/08/2021    BASOSABS 0.06 02/08/2021         DIAGNOSTIC TESTING:     No results found. IMPRESSION: Meghan Guzman is a 49 y.o. female with a past history remarkable for HL, GERD, referred for evaluation for screening colonoscopy. Patient reports family history of colorectal cancer in his second and third-degree relatives. Colonoscopy was completed and patient was identified to have mild left-sided diverticulosis. No colonic lesions were identified. Repeat colonoscopy in 7 to 10 years      Assessment  1. Diverticulosis        Raymundo Elliott was seen today for follow-up and colonoscopy. Diagnoses and all orders for this visit:    Diverticulosis-mild left-sided diverticular disease, limited to the sigmoid colon. All questions related to colonoscopy findings explained. No colon polyps, no large lesions. Repeat colonoscopy in 7 to 10 years. RTC: 1 clinically indicated or until next colonoscopy is due. Additional comments: Thank you for allowing me to participate in the care of Ms. Alexandra. For any further questions please do not hesitate to contact me. I have reviewed and agree with the ROS entered by the MA/LPN from today's encounter documented in a separate note.         Christopher Amanda MD, MPH   Board Certified in Gastroenterology  Board Certified in [address] [place] [address] #: [phone]    this note is created with the assistance of a speech recognition program.  While intending to generate a document that actually reflects the content of the visit, the document can still have some errors including those of syntax and sound a like substitutions which may escape proof reading. It such instances, actual meaning can be extrapolated by contextual diversion.

## 2023-07-19 ASSESSMENT — ENCOUNTER SYMPTOMS
NAUSEA: 0
SORE THROAT: 0
COLOR CHANGE: 0
ABDOMINAL DISTENTION: 0
CHEST TIGHTNESS: 0
CONSTIPATION: 0
COUGH: 0
DIARRHEA: 0
ABDOMINAL PAIN: 0
SHORTNESS OF BREATH: 0
RHINORRHEA: 0

## 2023-07-19 NOTE — PROGRESS NOTES
Sushila Castellon, APRN-CNP  [address] [facility]  [address] [address] [facility], [address] [place] [address]  Dept: [phone]  Dept Fax: [phone]    Patient ID: Darnell James is a 47 y.o. female Established patient. HPI    Presents new pt appointment and follow up, she was being seen at [facility] but with her new [facility]. - she is struggling with weight and going to [facility] and was started on ozempic yesterday and she has noticed her appetite is less, did get slight nausea but not bad at all. 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 She is going back to school at [address] [place] [address] for bachelor in nursing.   - her hip is bothering her due to her weight, her hot flashes and mood have been uncontrolled since being off Effexor, she has only been off of it for a month  - she is packing her lunch and really watching what she is eating and fiance is a good support   - right hip is still bothering her and is wanting to get back into PT    - she is a 38H in breast size, she is struggling because her back and neck are constantly hurting her, she has tried multiple different types of bras and nothing seems to help, even when she loses weight she doesn't lose the weight in her breast. She continues to struggle daily due to the weight of her breast and being short stature. Trouble buying a bathing suit. She is having indentation in her shoulders due to weight and pulling on the bra straps. Preventative care, female:  Last PAP: 2/13/2020, due 2025  Last mammogram: 12/2022  Last colonoscopy: 12/2021  Tdap: 2018  Nicotine use: never  Alcohol use: yes socially  Drug use: no  Last dental appointment: last year  Last optometry appointment: glasses, 2022    Previous office notes, labs, imaging and hospital records were reviewed prior to and during encounter.     Otherwise pt

## 2023-07-20 ENCOUNTER — HOSPITAL ENCOUNTER (OUTPATIENT)
Age: 53
Setting detail: SPECIMEN
Discharge: HOME | End: 2023-07-20

## 2023-07-20 ENCOUNTER — OFFICE VISIT (OUTPATIENT)
Dept: FAMILY MEDICINE CLINIC | Age: 53
End: 2023-07-20
Payer: COMMERCIAL

## 2023-07-20 VITALS
WEIGHT: 175 LBS | HEART RATE: 69 BPM | TEMPERATURE: 97.4 F | RESPIRATION RATE: 16 BRPM | DIASTOLIC BLOOD PRESSURE: 78 MMHG | SYSTOLIC BLOOD PRESSURE: 120 MMHG | OXYGEN SATURATION: 98 % | BODY MASS INDEX: 33.07 KG/M2

## 2023-07-20 DIAGNOSIS — E55.9 VITAMIN D DEFICIENCY: ICD-10-CM

## 2023-07-20 DIAGNOSIS — F32.A DEPRESSION, UNSPECIFIED DEPRESSION TYPE: ICD-10-CM

## 2023-07-20 DIAGNOSIS — E78.5 HYPERLIPIDEMIA, UNSPECIFIED HYPERLIPIDEMIA TYPE: Primary | ICD-10-CM

## 2023-07-20 DIAGNOSIS — M25.551 GREATER TROCHANTERIC PAIN SYNDROME OF RIGHT LOWER EXTREMITY: ICD-10-CM

## 2023-07-20 DIAGNOSIS — Z01.84 IMMUNITY STATUS TESTING: ICD-10-CM

## 2023-07-20 DIAGNOSIS — Z00.00 PREVENTATIVE HEALTH CARE: ICD-10-CM

## 2023-07-20 DIAGNOSIS — Z12.31 SCREENING MAMMOGRAM FOR BREAST CANCER: ICD-10-CM

## 2023-07-20 DIAGNOSIS — R23.2 HOT FLASHES: ICD-10-CM

## 2023-07-20 DIAGNOSIS — G47.00 INSOMNIA, UNSPECIFIED TYPE: ICD-10-CM

## 2023-07-20 DIAGNOSIS — F41.9 ANXIETY: ICD-10-CM

## 2023-07-20 DIAGNOSIS — G57.01 PIRIFORMIS SYNDROME OF RIGHT SIDE: ICD-10-CM

## 2023-07-20 LAB
HBV SURFACE AB SERPL IA-ACNC: 257.9 MIU/ML
RUBV IGG SERPL QL IA: 273.9 IU/ML

## 2023-07-20 PROCEDURE — G8427 DOCREV CUR MEDS BY ELIG CLIN: HCPCS | Performed by: NURSE PRACTITIONER

## 2023-07-20 PROCEDURE — 1036F TOBACCO NON-USER: CPT | Performed by: NURSE PRACTITIONER

## 2023-07-20 PROCEDURE — 99215 OFFICE O/P EST HI 40 MIN: CPT | Performed by: NURSE PRACTITIONER

## 2023-07-20 PROCEDURE — G8417 CALC BMI ABV UP PARAM F/U: HCPCS | Performed by: NURSE PRACTITIONER

## 2023-07-20 PROCEDURE — 3017F COLORECTAL CA SCREEN DOC REV: CPT | Performed by: NURSE PRACTITIONER

## 2023-07-20 PROCEDURE — 99417 PROLNG OP E/M EACH 15 MIN: CPT | Performed by: NURSE PRACTITIONER

## 2023-07-20 RX ORDER — MELOXICAM 15 MG/1
15 TABLET ORAL DAILY PRN
Qty: 30 TABLET | Refills: 1 | Status: SHIPPED | OUTPATIENT
Start: 2023-07-20

## 2023-07-20 RX ORDER — VENLAFAXINE HYDROCHLORIDE 75 MG/1
1 CAPSULE, EXTENDED RELEASE ORAL DAILY
COMMUNITY
End: 2023-07-20 | Stop reason: SDUPTHER

## 2023-07-20 RX ORDER — HYDROXYZINE HYDROCHLORIDE 25 MG/1
TABLET, FILM COATED ORAL
Qty: 30 TABLET | Refills: 1 | Status: SHIPPED | OUTPATIENT
Start: 2023-07-20

## 2023-07-20 RX ORDER — VENLAFAXINE HYDROCHLORIDE 75 MG/1
75 CAPSULE, EXTENDED RELEASE ORAL DAILY
Qty: 30 CAPSULE | Refills: 1 | Status: SHIPPED | OUTPATIENT
Start: 2023-07-20

## 2023-07-20 SDOH — ECONOMIC STABILITY: FOOD INSECURITY: WITHIN THE PAST 12 MONTHS, THE FOOD YOU BOUGHT JUST DIDN'T LAST AND YOU DIDN'T HAVE MONEY TO GET MORE.: NEVER TRUE

## 2023-07-20 SDOH — HEALTH STABILITY: PHYSICAL HEALTH: ON AVERAGE, HOW MANY MINUTES DO YOU ENGAGE IN EXERCISE AT THIS LEVEL?: 40 MIN

## 2023-07-20 SDOH — ECONOMIC STABILITY: HOUSING INSECURITY
IN THE LAST 12 MONTHS, WAS THERE A TIME WHEN YOU DID NOT HAVE A STEADY PLACE TO SLEEP OR SLEPT IN A SHELTER (INCLUDING NOW)?: NO

## 2023-07-20 SDOH — ECONOMIC STABILITY: INCOME INSECURITY: HOW HARD IS IT FOR YOU TO PAY FOR THE VERY BASICS LIKE FOOD, HOUSING, MEDICAL CARE, AND HEATING?: NOT HARD AT ALL

## 2023-07-20 SDOH — ECONOMIC STABILITY: FOOD INSECURITY: WITHIN THE PAST 12 MONTHS, YOU WORRIED THAT YOUR FOOD WOULD RUN OUT BEFORE YOU GOT MONEY TO BUY MORE.: NEVER TRUE

## 2023-07-20 SDOH — HEALTH STABILITY: PHYSICAL HEALTH: ON AVERAGE, HOW MANY DAYS PER WEEK DO YOU ENGAGE IN MODERATE TO STRENUOUS EXERCISE (LIKE A BRISK WALK)?: 3 DAYS

## 2023-07-20 ASSESSMENT — PATIENT HEALTH QUESTIONNAIRE - PHQ9
7. TROUBLE CONCENTRATING ON THINGS, SUCH AS READING THE NEWSPAPER OR WATCHING TELEVISION: 0
SUM OF ALL RESPONSES TO PHQ QUESTIONS 1-9: 8
10. IF YOU CHECKED OFF ANY PROBLEMS, HOW DIFFICULT HAVE THESE PROBLEMS MADE IT FOR YOU TO DO YOUR WORK, TAKE CARE OF THINGS AT HOME, OR GET ALONG WITH OTHER PEOPLE: 1
8. MOVING OR SPEAKING SO SLOWLY THAT OTHER PEOPLE COULD HAVE NOTICED. OR THE OPPOSITE, BEING SO FIGETY OR RESTLESS THAT YOU HAVE BEEN MOVING AROUND A LOT MORE THAN USUAL: 0
1. LITTLE INTEREST OR PLEASURE IN DOING THINGS: 1
SUM OF ALL RESPONSES TO PHQ QUESTIONS 1-9: 8
SUM OF ALL RESPONSES TO PHQ9 QUESTIONS 1 & 2: 3
5. POOR APPETITE OR OVEREATING: 0
9. THOUGHTS THAT YOU WOULD BE BETTER OFF DEAD, OR OF HURTING YOURSELF: 0
2. FEELING DOWN, DEPRESSED OR HOPELESS: 2
SUM OF ALL RESPONSES TO PHQ QUESTIONS 1-9: 8
3. TROUBLE FALLING OR STAYING ASLEEP: 3
6. FEELING BAD ABOUT YOURSELF - OR THAT YOU ARE A FAILURE OR HAVE LET YOURSELF OR YOUR FAMILY DOWN: 0
SUM OF ALL RESPONSES TO PHQ QUESTIONS 1-9: 8
4. FEELING TIRED OR HAVING LITTLE ENERGY: 2

## 2023-07-20 ASSESSMENT — ENCOUNTER SYMPTOMS: BACK PAIN: 1

## 2023-07-21 DIAGNOSIS — E78.5 HYPERLIPIDEMIA, UNSPECIFIED HYPERLIPIDEMIA TYPE: Primary | ICD-10-CM

## 2023-07-21 LAB
MEV IGG SER-ACNC: 2.18
MUV IGG SER IA-ACNC: 4.84
VZV IGG SER QL IA: 4.14

## 2023-07-21 RX ORDER — SIMVASTATIN 40 MG
40 TABLET ORAL EVERY EVENING
Qty: 30 TABLET | Refills: 3 | Status: SHIPPED | OUTPATIENT
Start: 2023-07-21

## 2023-08-30 NOTE — PROGRESS NOTES
Mono Leonard, APRN-CNP  [address] [facility]  [address] [address] [facility], [address] [address]  Dept: [phone]  Dept Fax: [phone]     Patient ID: John Raymond is a 47 y.o. female Established patient    HPI    Pt here today for f/u on chronic medical problems, go over labs and/or diagnostic studies, and medication refills. Pt denies any fever or chills. Pt today denies any HA, chest pain, or SOB. Pt denies any N/V/D/C or abdominal pain. - doing ozempic through the pharmacy  - still having trouble with sleeping at night and constantly waking up, unsure if it is due to RLS or not. The hydroxyzine causes drowsiness. -PT started Wednesday for hip. Otherwise pt doing well on current tx and no other concerns today. The patient's past medical, surgical, social, and family history as well as his current medications and allergies were reviewed as documented in today's encounter by MADINA Pineda. Previous office notes, labs, imaging and hospital records were reviewed prior to and during encounter. Current Outpatient Medications on File Prior to Visit   Medication Sig Dispense Refill    simvastatin (ZOCOR) 40 MG tablet Take 1 tablet by mouth every evening 30 tablet 3    venlafaxine (EFFEXOR XR) 75 MG extended release capsule Take 1 capsule by mouth daily 30 capsule 1    meloxicam (MOBIC) 15 MG tablet Take 1 tablet by mouth daily as needed for Pain 30 tablet 1    hydrOXYzine HCl (ATARAX) 25 MG tablet Take 1-2 tabs as needed at night for insomnia / anxiety 30 tablet 1    rOPINIRole (REQUIP) 0.25 MG tablet Take 1 tablet by mouth nightly 90 tablet 1     No current facility-administered medications on file prior to visit. Subjective:     Review of Systems   Constitutional:  Negative for activity change, fatigue and fever. HENT:  Negative for congestion, ear pain, rhinorrhea and sore throat.     Respiratory:  Negative for cough, chest tightness and

## 2023-08-31 ENCOUNTER — OFFICE VISIT (OUTPATIENT)
Dept: FAMILY MEDICINE CLINIC | Age: 53
End: 2023-08-31
Payer: COMMERCIAL

## 2023-08-31 ENCOUNTER — HOSPITAL ENCOUNTER (OUTPATIENT)
Age: 53
Setting detail: SPECIMEN
Discharge: HOME | End: 2023-08-31

## 2023-08-31 VITALS
HEART RATE: 77 BPM | OXYGEN SATURATION: 97 % | SYSTOLIC BLOOD PRESSURE: 118 MMHG | RESPIRATION RATE: 16 BRPM | BODY MASS INDEX: 32.47 KG/M2 | HEIGHT: 61 IN | WEIGHT: 172 LBS | TEMPERATURE: 98 F | DIASTOLIC BLOOD PRESSURE: 82 MMHG

## 2023-08-31 DIAGNOSIS — R23.2 HOT FLASHES: ICD-10-CM

## 2023-08-31 DIAGNOSIS — Z11.1 SCREENING FOR TUBERCULOSIS: ICD-10-CM

## 2023-08-31 DIAGNOSIS — G25.81 RLS (RESTLESS LEGS SYNDROME): ICD-10-CM

## 2023-08-31 DIAGNOSIS — R73.09 ELEVATED GLUCOSE: ICD-10-CM

## 2023-08-31 DIAGNOSIS — F32.A DEPRESSION, UNSPECIFIED DEPRESSION TYPE: ICD-10-CM

## 2023-08-31 DIAGNOSIS — F41.9 ANXIETY: Primary | ICD-10-CM

## 2023-08-31 PROCEDURE — 99213 OFFICE O/P EST LOW 20 MIN: CPT | Performed by: NURSE PRACTITIONER

## 2023-08-31 PROCEDURE — 3017F COLORECTAL CA SCREEN DOC REV: CPT | Performed by: NURSE PRACTITIONER

## 2023-08-31 PROCEDURE — G8427 DOCREV CUR MEDS BY ELIG CLIN: HCPCS | Performed by: NURSE PRACTITIONER

## 2023-08-31 PROCEDURE — G8417 CALC BMI ABV UP PARAM F/U: HCPCS | Performed by: NURSE PRACTITIONER

## 2023-08-31 PROCEDURE — 1036F TOBACCO NON-USER: CPT | Performed by: NURSE PRACTITIONER

## 2023-08-31 RX ORDER — OMEGA-3 FATTY ACIDS CAP DELAYED RELEASE 1000 MG 1000 MG
CAPSULE DELAYED RELEASE ORAL
COMMUNITY
Start: 2023-07-25

## 2023-08-31 RX ORDER — ROPINIROLE 0.5 MG/1
0.5 TABLET, FILM COATED ORAL NIGHTLY
Qty: 30 TABLET | Refills: 1 | Status: SHIPPED | OUTPATIENT
Start: 2023-08-31

## 2023-08-31 ASSESSMENT — ENCOUNTER SYMPTOMS
COUGH: 0
SHORTNESS OF BREATH: 0
CHEST TIGHTNESS: 0
NAUSEA: 0
ABDOMINAL PAIN: 0
SORE THROAT: 0
RHINORRHEA: 0
CONSTIPATION: 0
BACK PAIN: 0
DIARRHEA: 0
ABDOMINAL DISTENTION: 0
COLOR CHANGE: 0

## 2023-09-03 LAB
QUANTI TB GOLD PLUS: NEGATIVE
QUANTI TB1 MINUS NIL: 0 IU/ML (ref 0–0.34)
QUANTI TB2 MINUS NIL: 0 IU/ML (ref 0–0.34)
QUANTIFERON MITOGEN: 9.73 IU/ML
QUANTIFERON NIL: 0.05 IU/ML

## 2023-09-17 NOTE — TELEPHONE ENCOUNTER
From: Brandy Parker  To: Lenwood Merlin  Sent: 4/17/2022 9:24 AM EDT  Subject: Lexapro and weight gain    Froid,    I think the Lexapro is working, my moods seem to be more at ease. Im sleeping better. My hot flashes seem to be okay- but I honestly cant tell if Im going through a down swing or not. Problem is I have gained weight rather quickly - Im not aware of any diet changes. My scale has be back up to 165. I was 158 two weeks ago. Not sure whats going in, but its stressing me out. I dont like how my body is feeling, Im very uncomfortable. I am going to contact [facility] and get back in their program to see if that will help. Do you have any other suggestions?      Jeffry Cantor independent

## 2023-09-21 ENCOUNTER — TELEMEDICINE (OUTPATIENT)
Dept: FAMILY MEDICINE CLINIC | Age: 53
End: 2023-09-21
Payer: COMMERCIAL

## 2023-09-21 DIAGNOSIS — K52.9 ACUTE GASTROENTERITIS: Primary | ICD-10-CM

## 2023-09-21 PROCEDURE — 99213 OFFICE O/P EST LOW 20 MIN: CPT | Performed by: NURSE PRACTITIONER

## 2023-09-21 PROCEDURE — 3017F COLORECTAL CA SCREEN DOC REV: CPT | Performed by: NURSE PRACTITIONER

## 2023-09-21 PROCEDURE — G8427 DOCREV CUR MEDS BY ELIG CLIN: HCPCS | Performed by: NURSE PRACTITIONER

## 2023-09-21 ASSESSMENT — ENCOUNTER SYMPTOMS
CHEST TIGHTNESS: 0
SINUS PAIN: 0
COLOR CHANGE: 0
VOMITING: 1
CONSTIPATION: 0
ABDOMINAL DISTENTION: 0
NAUSEA: 1
BACK PAIN: 0
DIARRHEA: 0
RHINORRHEA: 0
COUGH: 0
SORE THROAT: 0
ABDOMINAL PAIN: 1
SHORTNESS OF BREATH: 0
SINUS PRESSURE: 0

## 2023-09-21 NOTE — PROGRESS NOTES
palsy        [] Abnormal-         Skin:                     [x] No significant exanthematous lesions or discoloration noted on facial skin         [] Abnormal-                                  Psychiatric:           [x] Normal Affect [x] No Hallucinations        [] Abnormal-      Other pertinent observable physical exam findings- Patient appears generally well, is speaking full sentences clearly without any observable SOB, no cough, no diaphoresis. Pt stable during visit, not currently having any abdominal pain, just feeling achy and tired. She is sitting on her couch. Currently keeping fluids down. On this date 9/21/2023 I have spent 30 minutes reviewing previous notes, test results and face to face (virtual) with the patient discussing the diagnosis and importance of compliance with the treatment plan as well as documenting on the day of the visit.     --Renetta Dominique, JULY - CNP

## 2023-09-27 DIAGNOSIS — F32.A DEPRESSION, UNSPECIFIED DEPRESSION TYPE: ICD-10-CM

## 2023-09-27 DIAGNOSIS — M25.551 GREATER TROCHANTERIC PAIN SYNDROME OF RIGHT LOWER EXTREMITY: ICD-10-CM

## 2023-09-27 RX ORDER — MELOXICAM 15 MG/1
15 TABLET ORAL DAILY PRN
Qty: 90 TABLET | Refills: 1 | Status: SHIPPED | OUTPATIENT
Start: 2023-09-27

## 2023-09-27 RX ORDER — VENLAFAXINE HYDROCHLORIDE 75 MG/1
75 CAPSULE, EXTENDED RELEASE ORAL DAILY
Qty: 90 CAPSULE | Refills: 1 | Status: SHIPPED | OUTPATIENT
Start: 2023-09-27

## 2023-10-18 ENCOUNTER — TELEMEDICINE (OUTPATIENT)
Dept: FAMILY MEDICINE CLINIC | Age: 53
End: 2023-10-18
Payer: COMMERCIAL

## 2023-10-18 DIAGNOSIS — F40.228 AEROPHOBIA: Primary | ICD-10-CM

## 2023-10-18 DIAGNOSIS — A09 TRAVELER'S DIARRHEA: ICD-10-CM

## 2023-10-18 PROCEDURE — 99213 OFFICE O/P EST LOW 20 MIN: CPT | Performed by: NURSE PRACTITIONER

## 2023-10-18 PROCEDURE — G8427 DOCREV CUR MEDS BY ELIG CLIN: HCPCS | Performed by: NURSE PRACTITIONER

## 2023-10-18 PROCEDURE — 3017F COLORECTAL CA SCREEN DOC REV: CPT | Performed by: NURSE PRACTITIONER

## 2023-10-18 RX ORDER — CIPROFLOXACIN 500 MG/1
500 TABLET, FILM COATED ORAL 2 TIMES DAILY
Qty: 6 TABLET | Refills: 0 | Status: SHIPPED | OUTPATIENT
Start: 2023-10-18 | End: 2023-10-21

## 2023-10-18 RX ORDER — ALPRAZOLAM 0.25 MG/1
TABLET ORAL
Qty: 10 TABLET | Refills: 0 | Status: SHIPPED | OUTPATIENT
Start: 2023-10-18 | End: 2023-11-01

## 2023-10-18 ASSESSMENT — ENCOUNTER SYMPTOMS
CONSTIPATION: 0
BACK PAIN: 0
RHINORRHEA: 0
COLOR CHANGE: 0
SORE THROAT: 0
ABDOMINAL DISTENTION: 0
NAUSEA: 0
DIARRHEA: 0
COUGH: 0
CHEST TIGHTNESS: 0
ABDOMINAL PAIN: 0
SHORTNESS OF BREATH: 0

## 2023-10-18 NOTE — PROGRESS NOTES
Psychiatric:           [x] Normal Affect [x] No Hallucinations        [] Abnormal-      Other pertinent observable physical exam findings- Patient appears generally well, is speaking full sentences clearly without any observable SOB, no cough, no diaphoresis. Pt stable in video, no anxiety at this time      On this date 10/18/2023 I have spent 20 minutes reviewing previous notes, test results and face to face (virtual) with the patient discussing the diagnosis and importance of compliance with the treatment plan as well as documenting on the day of the visit.     --[facility] [employer], APRN - CNP

## 2024-01-16 DIAGNOSIS — F32.A DEPRESSION, UNSPECIFIED DEPRESSION TYPE: ICD-10-CM

## 2024-01-16 RX ORDER — VENLAFAXINE HYDROCHLORIDE 75 MG/1
75 CAPSULE, EXTENDED RELEASE ORAL DAILY
Qty: 90 CAPSULE | Refills: 1 | Status: SHIPPED | OUTPATIENT
Start: 2024-01-16

## 2024-01-16 RX ORDER — VENLAFAXINE HYDROCHLORIDE 75 MG/1
75 CAPSULE, EXTENDED RELEASE ORAL DAILY
Qty: 90 CAPSULE | Refills: 1 | Status: SHIPPED | OUTPATIENT
Start: 2024-01-16 | End: 2024-01-16 | Stop reason: SDUPTHER

## 2024-01-25 DIAGNOSIS — M25.551 GREATER TROCHANTERIC PAIN SYNDROME OF RIGHT LOWER EXTREMITY: ICD-10-CM

## 2024-01-25 DIAGNOSIS — G25.81 RLS (RESTLESS LEGS SYNDROME): ICD-10-CM

## 2024-01-25 RX ORDER — ROPINIROLE 0.5 MG/1
0.5 TABLET, FILM COATED ORAL NIGHTLY
Qty: 30 TABLET | Refills: 1 | Status: SHIPPED | OUTPATIENT
Start: 2024-01-25

## 2024-01-25 RX ORDER — MELOXICAM 15 MG/1
15 TABLET ORAL DAILY PRN
Qty: 90 TABLET | Refills: 1 | Status: SHIPPED | OUTPATIENT
Start: 2024-01-25

## 2024-02-12 ENCOUNTER — OFFICE VISIT (OUTPATIENT)
Dept: FAMILY MEDICINE CLINIC | Age: 54
End: 2024-02-12
Payer: COMMERCIAL

## 2024-02-12 VITALS
HEART RATE: 76 BPM | OXYGEN SATURATION: 97 % | SYSTOLIC BLOOD PRESSURE: 114 MMHG | RESPIRATION RATE: 16 BRPM | TEMPERATURE: 97.4 F | DIASTOLIC BLOOD PRESSURE: 82 MMHG | WEIGHT: 164 LBS | BODY MASS INDEX: 30.99 KG/M2

## 2024-02-12 DIAGNOSIS — F33.41 RECURRENT MAJOR DEPRESSIVE DISORDER, IN PARTIAL REMISSION (HCC): ICD-10-CM

## 2024-02-12 DIAGNOSIS — E55.9 VITAMIN D DEFICIENCY: ICD-10-CM

## 2024-02-12 DIAGNOSIS — Z00.00 PREVENTATIVE HEALTH CARE: ICD-10-CM

## 2024-02-12 DIAGNOSIS — E78.5 HYPERLIPIDEMIA, UNSPECIFIED HYPERLIPIDEMIA TYPE: ICD-10-CM

## 2024-02-12 DIAGNOSIS — Z00.00 ANNUAL PHYSICAL EXAM: Primary | ICD-10-CM

## 2024-02-12 DIAGNOSIS — Z12.31 SCREENING MAMMOGRAM FOR BREAST CANCER: ICD-10-CM

## 2024-02-12 DIAGNOSIS — M25.551 GREATER TROCHANTERIC PAIN SYNDROME OF RIGHT LOWER EXTREMITY: ICD-10-CM

## 2024-02-12 DIAGNOSIS — G25.81 RLS (RESTLESS LEGS SYNDROME): ICD-10-CM

## 2024-02-12 PROCEDURE — 99396 PREV VISIT EST AGE 40-64: CPT | Performed by: NURSE PRACTITIONER

## 2024-02-12 PROCEDURE — G8484 FLU IMMUNIZE NO ADMIN: HCPCS | Performed by: NURSE PRACTITIONER

## 2024-02-12 NOTE — PATIENT INSTRUCTIONS
going out in the sun. Reapply sunblock every 2 hours and after swimming or sweating. Sun can also damage your skin on cool, windy days. Clouds and fog do not filter UV light. Make sure you reapply sunblock every 2 hours.   Avoid the sun in the middle of the day, between 10 AM to 4 PM. Your unprotected skin can be damaged in 15 minutes with direct sun exposure.   Personal Health  If you smoke, STOP. There are many resources available to help you successfully quit.   If you are sexually active, always practice safe sex and wear a condom.   See a dentist every 6 months.  See an eye doctor regularly.   Always wear a seat belt while in car.   Get a flu vaccine annually.   Get a tetanus booster vaccine every 10 years.   Psychosocial Health  Finally, make sure that you always have something to look forward to whether this is a vacation, a special event, or just a weekend off work.  Having something to look forward to helps to maintain positive focus and is good for mental health.

## 2024-02-12 NOTE — PROGRESS NOTES
Judgment: Judgment normal.         Assessment:      Diagnosis Orders   1. Annual physical exam        2. Screening mammogram for breast cancer  ABIOLA DIGITAL SCREEN W OR WO CAD BILATERAL      3. RLS (restless legs syndrome)        4. Greater trochanteric pain syndrome of right lower extremity        5. Recurrent major depressive disorder, in partial remission (HCC)        6. Hyperlipidemia, unspecified hyperlipidemia type  Lipid Panel      7. Preventative health care  Hemoglobin A1C    Comprehensive Metabolic Panel    CBC    TSH with Reflex      8. Vitamin D deficiency  Vitamin D 25 Hydroxy          Plan:     Hyperlipidemia, unspecified hyperlipidemia type  - Stable: Medication re-filled as needed, con't medications as prescribed, con't current tx plan  - Lifestyle changes: Decrease fats, sugars, carbohydrates, and increase routine exercise, try to get 150 minutes of aerobic activity a week and watch calorie portions and to limit her carbs in her diet.  - Foods that helps increase HDL include the following: Fish, nuts, flax, avocados, and legumes (such as soy, kidney beans, chickpeas).  - Will cont with low fat/chol diet and zocor and fish oil as ordered.     Greater trochanteric pain syndrome of right lower extremity  - completed PT, having surgery in December with Dr. Mc.     RLS (restless legs syndrome)  - Stable: Medication re-filled as needed, con't medications as prescribed, con't current tx plan  - continue requip nightly as previously prescribed.      Recurrent major depression  - Stable: Medication re-filled as needed, con't medications as prescribed, con't current tx plan  - continue Effexor as prescribed.   - Pt encouraged to deep breath and relax through anxious moments   - Offered reassurance and allowed to ventilate feelings and ask questions.   - Non-pharmological coping methods discussed.      Insomnia, unspecified type  - pt states hydroxyzine and trazodone do not work. Xanax did help with sleep 
none

## 2024-04-12 DIAGNOSIS — G25.81 RLS (RESTLESS LEGS SYNDROME): ICD-10-CM

## 2024-04-12 DIAGNOSIS — F32.A DEPRESSION, UNSPECIFIED DEPRESSION TYPE: ICD-10-CM

## 2024-04-12 RX ORDER — VENLAFAXINE HYDROCHLORIDE 75 MG/1
75 CAPSULE, EXTENDED RELEASE ORAL DAILY
Qty: 90 CAPSULE | Refills: 1 | Status: SHIPPED | OUTPATIENT
Start: 2024-04-12

## 2024-04-12 RX ORDER — ROPINIROLE 0.5 MG/1
0.5 TABLET, FILM COATED ORAL NIGHTLY
Qty: 30 TABLET | Refills: 1 | Status: SHIPPED | OUTPATIENT
Start: 2024-04-12

## (undated) DEVICE — INTENDED FOR TISSUE SEPARATION, AND OTHER PROCEDURES THAT REQUIRE A SHARP SURGICAL BLADE TO PUNCTURE OR CUT.: Brand: BARD-PARKER ® CARBON RIB-BACK BLADES

## (undated) DEVICE — GAUZE,SPONGE,3"X3",4PLY,NS,LF: Brand: MEDLINE

## (undated) DEVICE — CONNECTOR TBNG AUX H2O JET DISP FOR OLY 160/180 SER

## (undated) DEVICE — 4-PORT MANIFOLD: Brand: NEPTUNE 2

## (undated) DEVICE — BLADE ES ELASTOMERIC COAT INSUL DURABLE BEND UPTO 90DEG

## (undated) DEVICE — STANDARD HYPODERMIC NEEDLE,POLYPROPYLENE HUB: Brand: MONOJECT

## (undated) DEVICE — STERILE POLYISOPRENE POWDER-FREE SURGICAL GLOVES: Brand: PROTEXIS

## (undated) DEVICE — BNDG,ELSTC,MATRIX,STRL,2"X5YD,LF,HOOK&LP: Brand: MEDLINE

## (undated) DEVICE — DEFENDO AIR WATER SUCTION AND BIOPSY VALVE KIT FOR  OLYMPUS: Brand: DEFENDO AIR/WATER/SUCTION AND BIOPSY VALVE

## (undated) DEVICE — SYRINGE MED 50ML LUERLOCK TIP

## (undated) DEVICE — JELLY,LUBE,STERILE,FLIP TOP,TUBE,2-OZ: Brand: MEDLINE

## (undated) DEVICE — BASIN EMSIS 700ML GRAPHITE PLAS KID SHP GRAD

## (undated) DEVICE — NEEDLE SYR 18GA L1.5IN RED PLAS HUB S STL BLNT FILL W/O

## (undated) DEVICE — SOLUTION SURG PREP ANTIMICROBIAL 4 OZ SKIN WND EXIDINE

## (undated) DEVICE — FLUFF UNDERPAD,MODERATE: Brand: WINGS

## (undated) DEVICE — MHPB HAND AND FOOT PACK: Brand: MEDLINE INDUSTRIES, INC.

## (undated) DEVICE — GOWN,AURORA,NONRNF,XL,30/CS: Brand: MEDLINE

## (undated) DEVICE — STRIP,CLOSURE,WOUND,MEDI-STRIP,1/2X4: Brand: MEDLINE

## (undated) DEVICE — GOWN,POLY REINFORCED,LG: Brand: MEDLINE

## (undated) DEVICE — ADHESIVE SKIN CLOSURE TOP 36 CC HI VISC DERMBND MINI

## (undated) DEVICE — BANDAGE GZ W2XL75IN ST RAYON POLY CNFRM STRTCH LTWT

## (undated) DEVICE — SUTURE CHROMIC GUT SZ 4-0 L18IN ABSRB BRN L13MM P-3 3/8 CIR 1654G

## (undated) DEVICE — SOLUTION IV IRRIG POUR BRL 0.9% SODIUM CHL 2F7124

## (undated) DEVICE — ADAPTER,CATHETER/SYRINGE/LUER,STERILE: Brand: MEDLINE

## (undated) DEVICE — Device: Brand: DEFENDO VALVE AND CONNECTOR KIT

## (undated) DEVICE — SYRINGE MED 10ML LUERLOCK TIP W/O SFTY DISP

## (undated) DEVICE — TUBING INSUFFLATION CAP W/ EXT CARBON DIOX ENDO SMARTCAP

## (undated) DEVICE — TUBING, SUCTION, 3/16" X 10', STRAIGHT: Brand: MEDLINE

## (undated) DEVICE — ENDO KIT W/SYRINGE: Brand: MEDLINE INDUSTRIES, INC.

## (undated) DEVICE — TOURNIQUET PHLEB L EXSANGUINATING FOR AD DGT TOURNI-COT

## (undated) DEVICE — STERILE POLYISOPRENE POWDER-FREE SURGICAL GLOVES WITH EMOLLIENT COATING: Brand: PROTEXIS

## (undated) DEVICE — Z DISCONTINUED BY MEDLINE USE 2711682 TRAY SKIN PREP DRY W/ PREM GLV

## (undated) DEVICE — PENCIL ES L3M BTTN SWCH HOLSTER W/ BLDE ELECTRD EDGE

## (undated) DEVICE — GLOVE ORANGE PI 7   MSG9070